# Patient Record
Sex: FEMALE | Race: WHITE | NOT HISPANIC OR LATINO | Employment: OTHER | URBAN - METROPOLITAN AREA
[De-identification: names, ages, dates, MRNs, and addresses within clinical notes are randomized per-mention and may not be internally consistent; named-entity substitution may affect disease eponyms.]

---

## 2023-06-12 PROBLEM — E55.9 HYPOVITAMINOSIS D: Status: ACTIVE | Noted: 2023-06-12

## 2023-06-12 PROBLEM — L03.90 CELLULITIS: Status: ACTIVE | Noted: 2023-06-12
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
## 2023-06-12 PROBLEM — M62.838 MUSCLE SPASMS OF NECK: Status: ACTIVE | Noted: 2023-06-12

## 2023-06-12 PROBLEM — R49.0 MUSCULAR TENSION DYSPHONIA: Status: ACTIVE | Noted: 2023-06-12

## 2023-06-12 PROBLEM — R49.0 HOARSENESS: Status: ACTIVE | Noted: 2023-06-12

## 2023-06-12 PROBLEM — E78.5 DYSLIPIDEMIA: Status: ACTIVE | Noted: 2023-06-12

## 2023-06-12 PROBLEM — J20.9 ACUTE BRONCHITIS: Status: ACTIVE | Noted: 2023-06-12

## 2023-06-12 PROBLEM — J30.0 VASOMOTOR RHINITIS: Status: ACTIVE | Noted: 2023-06-12

## 2023-06-12 PROBLEM — M19.90 ARTHRITIS: Status: ACTIVE | Noted: 2023-06-12

## 2023-06-12 PROBLEM — M54.2 CERVICALGIA: Status: ACTIVE | Noted: 2023-06-12

## 2023-06-12 PROBLEM — R09.81 NASAL CONGESTION: Status: ACTIVE | Noted: 2023-06-12

## 2023-06-12 PROBLEM — G89.29 CHRONIC LEFT SHOULDER PAIN: Status: ACTIVE | Noted: 2023-06-12

## 2023-06-12 PROBLEM — J32.9 CHRONIC SINUSITIS: Status: ACTIVE | Noted: 2023-06-12

## 2023-06-12 PROBLEM — J31.0 CHRONIC RHINITIS: Status: ACTIVE | Noted: 2023-06-12

## 2023-06-12 PROBLEM — M53.3 COCCYDYNIA: Status: ACTIVE | Noted: 2023-06-12

## 2023-06-12 PROBLEM — D75.89 MACROCYTOSIS: Status: ACTIVE | Noted: 2023-06-12

## 2023-06-12 PROBLEM — R49.0 RASPY VOICE: Status: ACTIVE | Noted: 2023-06-12

## 2023-06-12 PROBLEM — M25.519 SHOULDER PAIN: Status: ACTIVE | Noted: 2023-06-12

## 2023-06-12 PROBLEM — N95.1 MENOPAUSAL SYMPTOM: Status: ACTIVE | Noted: 2023-06-12

## 2023-06-12 PROBLEM — M13.0 POLYARTHROPATHY: Status: ACTIVE | Noted: 2023-06-12

## 2023-06-12 PROBLEM — M25.512 CHRONIC LEFT SHOULDER PAIN: Status: ACTIVE | Noted: 2023-06-12

## 2023-06-12 RX ORDER — GABAPENTIN 100 MG/1
100 CAPSULE ORAL NIGHTLY
COMMUNITY
End: 2024-01-05 | Stop reason: SDUPTHER

## 2023-06-12 RX ORDER — FLUTICASONE PROPIONATE 50 MCG
1 SPRAY, SUSPENSION (ML) NASAL DAILY
COMMUNITY
Start: 2017-12-12

## 2023-06-12 RX ORDER — ACETAMINOPHEN 500 MG
1 TABLET ORAL DAILY
COMMUNITY

## 2023-06-12 RX ORDER — IPRATROPIUM BROMIDE 42 UG/1
1 SPRAY, METERED NASAL DAILY
COMMUNITY
Start: 2018-06-05 | End: 2024-01-05 | Stop reason: SDUPTHER

## 2023-06-12 RX ORDER — CELECOXIB 200 MG/1
200 CAPSULE ORAL 2 TIMES DAILY
COMMUNITY
Start: 2021-06-07 | End: 2023-06-13

## 2023-06-12 RX ORDER — PANTOPRAZOLE SODIUM 20 MG/1
1 TABLET, DELAYED RELEASE ORAL DAILY
COMMUNITY
Start: 2021-06-07 | End: 2023-06-13

## 2023-06-13 ENCOUNTER — OFFICE VISIT (OUTPATIENT)
Dept: PRIMARY CARE | Facility: CLINIC | Age: 78
End: 2023-06-13
Payer: MEDICARE

## 2023-06-13 VITALS
DIASTOLIC BLOOD PRESSURE: 66 MMHG | WEIGHT: 130.6 LBS | TEMPERATURE: 97.2 F | SYSTOLIC BLOOD PRESSURE: 120 MMHG | OXYGEN SATURATION: 99 % | BODY MASS INDEX: 22.42 KG/M2 | HEART RATE: 76 BPM | RESPIRATION RATE: 18 BRPM

## 2023-06-13 DIAGNOSIS — Z00.00 MEDICARE ANNUAL WELLNESS VISIT, SUBSEQUENT: Primary | ICD-10-CM

## 2023-06-13 PROCEDURE — 1036F TOBACCO NON-USER: CPT | Performed by: INTERNAL MEDICINE

## 2023-06-13 PROCEDURE — 1170F FXNL STATUS ASSESSED: CPT | Performed by: INTERNAL MEDICINE

## 2023-06-13 PROCEDURE — G0439 PPPS, SUBSEQ VISIT: HCPCS | Performed by: INTERNAL MEDICINE

## 2023-06-13 PROCEDURE — 1159F MED LIST DOCD IN RCRD: CPT | Performed by: INTERNAL MEDICINE

## 2023-06-13 RX ORDER — ESTRADIOL 0.03 MG/D
1 PATCH TRANSDERMAL
COMMUNITY
Start: 2022-12-05 | End: 2024-03-12 | Stop reason: WASHOUT

## 2023-06-13 RX ORDER — MEDROXYPROGESTERONE ACETATE 10 MG/1
2 TABLET ORAL
COMMUNITY
Start: 2023-02-22 | End: 2024-03-12 | Stop reason: WASHOUT

## 2023-06-13 RX ORDER — IPRATROPIUM BROMIDE 42 UG/1
1 SPRAY, METERED NASAL DAILY PRN
COMMUNITY
Start: 2022-12-19 | End: 2023-06-13

## 2023-06-13 ASSESSMENT — PATIENT HEALTH QUESTIONNAIRE - PHQ9
2. FEELING DOWN, DEPRESSED OR HOPELESS: NOT AT ALL
1. LITTLE INTEREST OR PLEASURE IN DOING THINGS: NOT AT ALL
SUM OF ALL RESPONSES TO PHQ9 QUESTIONS 1 AND 2: 0

## 2023-06-13 ASSESSMENT — ACTIVITIES OF DAILY LIVING (ADL)
DOING_HOUSEWORK: INDEPENDENT
BATHING: INDEPENDENT
GROCERY_SHOPPING: INDEPENDENT
DRESSING: INDEPENDENT
MANAGING_FINANCES: INDEPENDENT
TAKING_MEDICATION: INDEPENDENT

## 2023-06-13 ASSESSMENT — PAIN SCALES - GENERAL: PAINLEVEL: 0-NO PAIN

## 2023-06-13 NOTE — PROGRESS NOTES
Subjective   Reason for Visit: Maria Luisa Tirado is an 77 y.o. female here for a Medicare Wellness visit.     Past Medical, Surgical, and Family History reviewed and updated in chart.    Reviewed all medications by prescribing practitioner or clinical pharmacist (such as prescriptions, OTCs, herbal therapies and supplements) and documented in the medical record.    HPIPatient presents to the office for scheduled visit and Medicare wellness.    Last seen in the office back in December    At that time a bone density was ordered and labs requested we reviewed that up-to-date including bone density being normal    Overall has been stable blood pressure is great compliant active and falls no serious other changes in her health    Patient Care Team:  Michaela Hassan MD as PCP - General (Internal Medicine)  Michaela Hassan MD as PCP - [employer]vantage PCP     Review of [id number] systems reviewed does not mention were negative    Objective   Vitals:  /66 (BP Location: Left arm, Patient Position: Sitting, BP Cuff Size: Adult)   Pulse 76   Temp 36.2 °C (97.2 °F)   Resp 18   Wt 59.2 kg (130 lb 9.6 oz)   SpO2 99%   BMI 22.42 kg/m²       Physical ExamHEENT normocephalic atraumatic pupils round and reactive no oropharyngeal exudate no thyromegaly  Carotid bruits absent  Cardiovascular regular rate and rhythm no murmurs  Respiratory bilateral breath sounds without rales or rhonchi or mitral chest expansion bilaterally  Abdomen soft nontender bowel sounds are present no organomegaly  Skin warm without any rashes  Musculoskeletal no digital clubbing or cyanosis normal gait no muscle atrophy  Neuro alert and oriented Ãƒ-3. Speech clear. Follows commands appropriately  Pulse normal carotid pulse normal radial pulse normal pedal pulses      Assessment/Plan   Problem List Items Addressed This Visit          Other    Medicare annual wellness visit, subsequent - Primary   Bone density discussed    No follow-up     Labs  reviewed    Meds reconciled    Vaccination up-to-date

## 2023-12-05 ENCOUNTER — APPOINTMENT (OUTPATIENT)
Dept: PRIMARY CARE | Facility: CLINIC | Age: 78
End: 2023-12-05
Payer: MEDICARE

## 2024-01-05 ENCOUNTER — OFFICE VISIT (OUTPATIENT)
Dept: PRIMARY CARE | Facility: CLINIC | Age: 79
End: 2024-01-05
Payer: MEDICARE

## 2024-01-05 VITALS
HEIGHT: 64 IN | SYSTOLIC BLOOD PRESSURE: 112 MMHG | OXYGEN SATURATION: 98 % | WEIGHT: 131 LBS | DIASTOLIC BLOOD PRESSURE: 70 MMHG | BODY MASS INDEX: 22.36 KG/M2 | HEART RATE: 88 BPM

## 2024-01-05 DIAGNOSIS — Z00.00 MEDICARE ANNUAL WELLNESS VISIT, SUBSEQUENT: Primary | ICD-10-CM

## 2024-01-05 DIAGNOSIS — N95.1 MENOPAUSAL SYMPTOM: ICD-10-CM

## 2024-01-05 DIAGNOSIS — G89.29 CHRONIC SHOULDER PAIN, UNSPECIFIED LATERALITY: ICD-10-CM

## 2024-01-05 DIAGNOSIS — M54.2 CERVICALGIA: ICD-10-CM

## 2024-01-05 DIAGNOSIS — J30.0 VASOMOTOR RHINITIS: ICD-10-CM

## 2024-01-05 DIAGNOSIS — M13.0 POLYARTHROPATHY: ICD-10-CM

## 2024-01-05 DIAGNOSIS — M25.519 CHRONIC SHOULDER PAIN, UNSPECIFIED LATERALITY: ICD-10-CM

## 2024-01-05 DIAGNOSIS — L84 CORN OF FOOT: ICD-10-CM

## 2024-01-05 PROCEDURE — 1126F AMNT PAIN NOTED NONE PRSNT: CPT | Performed by: STUDENT IN AN ORGANIZED HEALTH CARE EDUCATION/TRAINING PROGRAM

## 2024-01-05 PROCEDURE — 1160F RVW MEDS BY RX/DR IN RCRD: CPT | Performed by: STUDENT IN AN ORGANIZED HEALTH CARE EDUCATION/TRAINING PROGRAM

## 2024-01-05 PROCEDURE — 1170F FXNL STATUS ASSESSED: CPT | Performed by: STUDENT IN AN ORGANIZED HEALTH CARE EDUCATION/TRAINING PROGRAM

## 2024-01-05 PROCEDURE — 1124F ACP DISCUSS-NO DSCNMKR DOCD: CPT | Performed by: STUDENT IN AN ORGANIZED HEALTH CARE EDUCATION/TRAINING PROGRAM

## 2024-01-05 PROCEDURE — 1159F MED LIST DOCD IN RCRD: CPT | Performed by: STUDENT IN AN ORGANIZED HEALTH CARE EDUCATION/TRAINING PROGRAM

## 2024-01-05 PROCEDURE — 1036F TOBACCO NON-USER: CPT | Performed by: STUDENT IN AN ORGANIZED HEALTH CARE EDUCATION/TRAINING PROGRAM

## 2024-01-05 PROCEDURE — 99213 OFFICE O/P EST LOW 20 MIN: CPT | Performed by: STUDENT IN AN ORGANIZED HEALTH CARE EDUCATION/TRAINING PROGRAM

## 2024-01-05 PROCEDURE — G0439 PPPS, SUBSEQ VISIT: HCPCS | Performed by: STUDENT IN AN ORGANIZED HEALTH CARE EDUCATION/TRAINING PROGRAM

## 2024-01-05 RX ORDER — GABAPENTIN 100 MG/1
100 CAPSULE ORAL NIGHTLY
Qty: 90 CAPSULE | Refills: 0 | Status: SHIPPED | OUTPATIENT
Start: 2024-01-05

## 2024-01-05 RX ORDER — IPRATROPIUM BROMIDE 42 UG/1
2 SPRAY, METERED NASAL DAILY
Qty: 15 ML | Refills: 2 | Status: SHIPPED | OUTPATIENT
Start: 2024-01-05 | End: 2024-04-01 | Stop reason: SDUPTHER

## 2024-01-05 ASSESSMENT — ACTIVITIES OF DAILY LIVING (ADL)
GROCERY_SHOPPING: INDEPENDENT
MANAGING_FINANCES: INDEPENDENT
DOING_HOUSEWORK: INDEPENDENT
BATHING: INDEPENDENT
DRESSING: INDEPENDENT
TAKING_MEDICATION: INDEPENDENT

## 2024-01-05 ASSESSMENT — PATIENT HEALTH QUESTIONNAIRE - PHQ9
2. FEELING DOWN, DEPRESSED OR HOPELESS: NOT AT ALL
SUM OF ALL RESPONSES TO PHQ9 QUESTIONS 1 AND 2: 0
1. LITTLE INTEREST OR PLEASURE IN DOING THINGS: NOT AT ALL

## 2024-01-05 NOTE — PROGRESS NOTES
Subjective   Patient ID: Maria Luisa Tirado is a 78 y.o. female who presents for Medicare Annual Wellness Visit Subsequent (New patient medicare annual wellness/Corns on the bottoms of her feet that are painful , and makes it hard to walk/Recently had basal cell skin cancer removed on 2 spots on her left leg).  Long history of corns on her feet. Interested in seeing a podiatrist.     Chronic arthritis. Right shoulder pain.     Hand numbness and tingling. Sitting up makes it worse. Affecting her sleep.     Carpal tunnel surgery both wrists 2002.     Takes gabapentin for low back pain. Usually just 100mg. Has not tried higher doses at bedtime.     Still gets bad hot flashes. Uses estrogen patch.     Walks 2-3 miles per day. No problems with that.     Digestion is good.     Retired  nurse.         Review of Systems   Constitutional: Negative.    HENT:  Positive for rhinorrhea.    Respiratory: Negative.     Cardiovascular: Negative.    Gastrointestinal: Negative.    Endocrine: Positive for heat intolerance.   Genitourinary: Negative.    Musculoskeletal:  Negative for neck pain.   Neurological: Negative.    Psychiatric/Behavioral:  Positive for sleep disturbance.    All other systems reviewed and are negative.      Objective   Physical Exam  Vitals reviewed.   Constitutional:       Appearance: Normal appearance.   HENT:      Head: Normocephalic.   Eyes:      Pupils: Pupils are equal, round, and reactive to light.   Cardiovascular:      Rate and Rhythm: Normal rate and regular rhythm.   Pulmonary:      Effort: Pulmonary effort is normal. No respiratory distress.      Breath sounds: Normal breath sounds. No wheezing or rhonchi.   Musculoskeletal:         General: Normal range of motion.   Neurological:      General: No focal deficit present.      Mental Status: She is alert.   Psychiatric:         Behavior: Behavior normal.         Body mass index is 22.49 kg/m².      Current Outpatient Medications:     cholecalciferol  (Vitamin D-3) 5,000 Units tablet, Take 1 tablet (5,000 Units) by mouth once daily., Disp: , Rfl:     estradiol (Climara) 0.025 mg/24 hr patch, Place 1 patch on the skin 1 (one) time per week., Disp: , Rfl:     fluticasone (Flonase) 50 mcg/actuation nasal spray, Administer 1 spray into affected nostril(s) once daily., Disp: , Rfl:     medroxyPROGESTERone (Provera) 10 mg tablet, 2 mg.  TAKE ONE TABLET BY MOUTH ONCE A WEEK WITH PATCH AS DIRECTED, Disp: , Rfl:     gabapentin (Neurontin) 100 mg capsule, Take 1 capsule (100 mg) by mouth once daily at bedtime., Disp: 90 capsule, Rfl: 0    ipratropium (Atrovent) 42 mcg (0.06 %) nasal spray, Administer 2 sprays into each nostril once daily., Disp: 15 mL, Rfl: 2      Assessment/Plan   Diagnoses and all orders for this visit:  Medicare annual wellness visit, subsequent  Comments:  UTD on mammogram and colonoscopy  [place] of foot  -     Referral to Podiatry; Future  Cervicalgia  Comments:  xray ordered  recommended increasing gabapentin at bedtime to see if offers more relief  Orders:  -     gabapentin (Neurontin) 100 mg capsule; Take 1 capsule (100 mg) by mouth once daily at bedtime.  -     XR cervical spine 2-3 views; Future  Vasomotor rhinitis  -     ipratropium (Atrovent) 42 mcg (0.06 %) nasal spray; Administer 2 sprays into each nostril once daily.  Chronic shoulder pain, unspecified laterality  Polyarthropathy  Menopausal symptom    Follow up in 6 months       Ysabel Ignacio DO 01/07/24 3:06 PM

## 2024-01-07 ASSESSMENT — ENCOUNTER SYMPTOMS
CARDIOVASCULAR NEGATIVE: 1
RHINORRHEA: 1
RESPIRATORY NEGATIVE: 1
GASTROINTESTINAL NEGATIVE: 1
NECK PAIN: 0
SLEEP DISTURBANCE: 1
CONSTITUTIONAL NEGATIVE: 1
NEUROLOGICAL NEGATIVE: 1

## 2024-01-08 ENCOUNTER — ANCILLARY PROCEDURE (OUTPATIENT)
Dept: RADIOLOGY | Facility: CLINIC | Age: 79
End: 2024-01-08
Payer: MEDICARE

## 2024-01-08 DIAGNOSIS — M54.2 CERVICALGIA: ICD-10-CM

## 2024-01-08 PROCEDURE — 72040 X-RAY EXAM NECK SPINE 2-3 VW: CPT

## 2024-01-08 PROCEDURE — 72040 X-RAY EXAM NECK SPINE 2-3 VW: CPT | Performed by: RADIOLOGY

## 2024-01-13 ENCOUNTER — PATIENT MESSAGE (OUTPATIENT)
Dept: PRIMARY CARE | Facility: CLINIC | Age: 79
End: 2024-01-13
Payer: MEDICARE

## 2024-01-13 DIAGNOSIS — M54.2 CERVICALGIA: Primary | ICD-10-CM

## 2024-01-13 DIAGNOSIS — M25.519 CHRONIC SHOULDER PAIN, UNSPECIFIED LATERALITY: ICD-10-CM

## 2024-01-13 DIAGNOSIS — G89.29 CHRONIC SHOULDER PAIN, UNSPECIFIED LATERALITY: ICD-10-CM

## 2024-01-31 ENCOUNTER — EVALUATION (OUTPATIENT)
Dept: PHYSICAL THERAPY | Facility: CLINIC | Age: 79
End: 2024-01-31
Payer: MEDICARE

## 2024-01-31 DIAGNOSIS — M54.2 CERVICALGIA: ICD-10-CM

## 2024-01-31 DIAGNOSIS — G89.29 CHRONIC SHOULDER PAIN, UNSPECIFIED LATERALITY: ICD-10-CM

## 2024-01-31 DIAGNOSIS — M25.519 CHRONIC SHOULDER PAIN, UNSPECIFIED LATERALITY: ICD-10-CM

## 2024-01-31 PROCEDURE — 97110 THERAPEUTIC EXERCISES: CPT | Mod: GP | Performed by: PHYSICAL THERAPIST

## 2024-01-31 PROCEDURE — 97161 PT EVAL LOW COMPLEX 20 MIN: CPT | Mod: GP | Performed by: PHYSICAL THERAPIST

## 2024-01-31 ASSESSMENT — COLUMBIA-SUICIDE SEVERITY RATING SCALE - C-SSRS
2. HAVE YOU ACTUALLY HAD ANY THOUGHTS OF KILLING YOURSELF?: NO
1. IN THE PAST MONTH, HAVE YOU WISHED YOU WERE DEAD OR WISHED YOU COULD GO TO SLEEP AND NOT WAKE UP?: NO
6. HAVE YOU EVER DONE ANYTHING, STARTED TO DO ANYTHING, OR PREPARED TO DO ANYTHING TO END YOUR LIFE?: NO

## 2024-01-31 ASSESSMENT — ENCOUNTER SYMPTOMS
DEPRESSION: 0
OCCASIONAL FEELINGS OF UNSTEADINESS: 0
LOSS OF SENSATION IN FEET: 0

## 2024-01-31 ASSESSMENT — PATIENT HEALTH QUESTIONNAIRE - PHQ9
SUM OF ALL RESPONSES TO PHQ9 QUESTIONS 1 AND 2: 0
2. FEELING DOWN, DEPRESSED OR HOPELESS: NOT AT ALL
1. LITTLE INTEREST OR PLEASURE IN DOING THINGS: NOT AT ALL

## 2024-01-31 NOTE — PROGRESS NOTES
Physical Therapy    Physical Therapy Evaluation    Patient Name: Maria Luisa Tirado  MRN: [id number]  Today's Date: 01/31/24  Time Calculation  Start Time: 1115  Stop Time: 1200  Time Calculation (min): 45 min     Insurance:  Visit number: 1   Insurance Type: Payor: [employer] / Plan: [employer] ADVANTAGE / Product Type: *No Product type* /   Authorization or Plan of Care date Range: needs authorization    Therapy diagnoses:   1. Cervicalgia  Referral to Physical Therapy    Follow Up In Physical Therapy      2. Chronic shoulder pain, unspecified laterality  Referral to Physical Therapy    Follow Up In Physical Therapy           General:  Reason for visit: cervicalgia    Referred by:  DO Fabian Solitario MD appt:  7/17/24     Precautions:  none  Fall Risk: Low     Assessment:   Patient presents with signs and symptoms of cervical spine arthritis with radicular symptoms. Will benefit from skilled PT in order to decrease pain with mobility and ADL's    Impairments: Pain, Strength, Activity limitations, Flexibility, Joint mobility, Posture, Decreased functional level, and Participation restrictions    Functional Limitations: Reaching, Lifting, Dressing, Sleeping, and Sitting    Recommended Treatment:  Therapeutic exercise, Manual therapy, Home program instruction and progression, Neuromuscular re-education, Therapeutic activities, Self care and home management, Instruction in activity modification, and Electrical stimulation      Plan:  Plan of care was developed with input and agreement by the patient.  1-2x per week for 4-6 weeks    Rehab Potential: Good to achieve goals.    Goals:  Short Term Goals:   -Patient to be Indep with Home Exercise Program for symptom management.    Long Term Goals:   -NDI: 0-19% impairment to indicate a significant improvement in overall function.      -Patient will demonstrate correct posture with min to no cueing to allow for correct loading strategy.    -Patient will demo mild to  "no limitation AROM of the cervical spine to allow for correct mechanics with functional mobility.      -Patient will report driving without pain to allow for return to work and ADLs without limitation.     -Patient will report reading >30 min without pain to allow for return to work and ADLs without limitation.       Subjective:  - CC:  cervical radiculopathy, can radiate down both arms. Can have numbness and tingling at times. Some neck pain when in poor posture  - KURT:  chronic   - DOI/onset: last summer  - PAIN - Location: bilateral UE Worst(past 24 hours): 6-7/10   - PAIN - Alleviating: ibuprofen, ice  Aggravating: dressing, sleeping, housework, reading  - CURRENT MEDICAL MANAGEMENT: NSAIDs  - PLOF: some hx of carpal tunnel  - FUNCTIONAL LIMITATIONS: reaching, lifting, sitting, housework   - LIVING ENVIRONMENT: no barriers  - WORK: retired nurse  - EXERCISE:minimal at this time     Medical History Form: Reviewed (scanned into chart)       Objective:   Patient denies symptoms consistent with diplopia, dizziness, drop attacks, dysarthria, dysphagia, ataxia, nausea, numbness, and/or nystagmus.     -POSTURE:   Sitting: Fair  Standing Fair  Protruded head: yes    -PALPATION: tender bilateral UT/levator and cervical paraspinals    -AROM  Flexion: WFL  Extension:  WFL  Sidebending(R):  30 deg  Sidebending (L):  25 deg  Rotation (R): 55 deg  Rotation (L): 45 deg    - DERMATIOMES B UE: WFL    - MYOTOMES (MMT in sitting): WFL    - REPEATED MOTIONS:  produces pain with extension, no pain with cervical retraction    - SHOULDER AROM and MMT is WFL                     - CERVICAL SPECIAL TESTS :  Spurling Compression: increases pain  Distraction decreases pain    Outcome Measures:  Other Measures  Neck Disability Index: 10     Treatment Performed: (\"NP\" = Not Performed)     Therapeutic Exercise:     15 minutes  Access Code: [email or web address]  URL: [email or web address]  Date: 01/31/2024  Prepared by: Ulysses" Carlota    Exercises  - Seated Cervical Retraction  - 1 x daily - 7 x weekly - 2 sets - 10 reps - 5 hold  - Supine Cervical Retraction with Towel  - 1 x daily - 7 x weekly - 2 sets - 10 reps - 5 hold  - Seated Upper Trapezius Stretch  - 1 x daily - 7 x weekly - 3 reps - 30 hold  - Gentle Levator Scapulae Stretch  - 1 x daily - 7 x weekly - 3 reps - 30 hold  - Seated Cervical Sidebending AROM  - 1 x daily - 7 x weekly - 20 reps - 5 hold  - Seated Cervical Rotation AROM  - 1 x daily - 7 x weekly - 20 reps - 5 hold  - Seated Scapular Retraction  - 1 x daily - 7 x weekly - 20 reps - 5 hold  - Seated Shoulder Shrug Circles AROM Backward  - 1 x daily - 7 x weekly - 20 reps - 5 hold    Manual Therapy:       minutes      Neuromuscular Re-education:      minutes      Gait Training:            minutes      Therapeutic Activity:      minutes      Modalities:       Vasopneumatic Device       minutes  Electrical Stimulation          minutes  Ultrasound            minutes  Iontophoresis                     minutes  Cold Pack            minutes  Mechanical Traction           minutes    Evaluation Complexity: Low: 30 minutes; Moderate   minutes; Complex PT Evaluation Time Entry: 30;  minutes    Re-Evaluation:   minutes

## 2024-02-07 ENCOUNTER — TREATMENT (OUTPATIENT)
Dept: PHYSICAL THERAPY | Facility: CLINIC | Age: 79
End: 2024-02-07
Payer: MEDICARE

## 2024-02-07 DIAGNOSIS — M54.2 CERVICALGIA: ICD-10-CM

## 2024-02-07 DIAGNOSIS — M25.519 CHRONIC SHOULDER PAIN, UNSPECIFIED LATERALITY: ICD-10-CM

## 2024-02-07 DIAGNOSIS — G89.29 CHRONIC SHOULDER PAIN, UNSPECIFIED LATERALITY: ICD-10-CM

## 2024-02-07 PROCEDURE — 97140 MANUAL THERAPY 1/> REGIONS: CPT | Mod: GP | Performed by: PHYSICAL THERAPIST

## 2024-02-07 PROCEDURE — 97110 THERAPEUTIC EXERCISES: CPT | Mod: GP | Performed by: PHYSICAL THERAPIST

## 2024-02-14 ENCOUNTER — TREATMENT (OUTPATIENT)
Dept: PHYSICAL THERAPY | Facility: CLINIC | Age: 79
End: 2024-02-14
Payer: MEDICARE

## 2024-02-14 DIAGNOSIS — M54.2 CERVICALGIA: ICD-10-CM

## 2024-02-14 DIAGNOSIS — G89.29 CHRONIC SHOULDER PAIN, UNSPECIFIED LATERALITY: ICD-10-CM

## 2024-02-14 DIAGNOSIS — M25.519 CHRONIC SHOULDER PAIN, UNSPECIFIED LATERALITY: ICD-10-CM

## 2024-02-14 PROCEDURE — 97140 MANUAL THERAPY 1/> REGIONS: CPT | Mod: GP | Performed by: PHYSICAL THERAPIST

## 2024-02-14 PROCEDURE — 97110 THERAPEUTIC EXERCISES: CPT | Mod: GP | Performed by: PHYSICAL THERAPIST

## 2024-02-14 NOTE — PROGRESS NOTES
"                                                                                                                         PHYSICAL THERAPY TREATMENT NOTE    Patient Name:  Maria Luisa Tirado   Patient MRN: [id number]  Date: 02/14/24  Time Calculation  Start Time: 1110  Stop Time: 1150  Time Calculation (min): 40 min    Insurance:  Visit number: 3 of 5  Insurance Type: Payor: [employer] / Plan: [employer] MEDICARE ADVANTAGE / Product Type: *No Product type* /   Authorization or Plan of Care date Range: exp 3/30/24    Therapy diagnoses:   1. Cervicalgia  Follow Up In Physical Therapy      2. Chronic shoulder pain, unspecified laterality  Follow Up In Physical Therapy        General:  Reason for visit: cervicalgia    Referred by: DO Fabian Solitario MD appt:  7/17/24     Precautions:  none  Fall Risk: Low    Assessment:  Education: Reviewed home exercise program. Provided verbal feedback to improve exercise technique.  Progress towards functional goals: Reduced intensity of pain.  Response to interventions: Patient tolerated therapeutic interventions well and without any adverse events. Patient was appropriately fatigued with no complaints.  Justification for continued skilled care: To address remaining functional, objective and subjective deficits to allow them to return to full independence with ADLs. Reduce pain to improve function.    Plan:  Exercises targeting surrounding musculature to stabilize the joint and improve function. Manual therapy to improve joint mobility.    Subjective:   Maria Luisa reports she feels like her condition is neither improving nor worsening. Continues to be L sided mostly, trying to be more aware of posture when walking   Pain (0-10): 5    HEP adherence / understanding: compliance with the instructed home exercises.    Objective:  More tender with palpation L sided cervical parapsinals and UT    Treatment Performed: (\"NP\" = Not Performed)     Therapeutic Exercise:     25 minutes  UBE 6' " retro  Pulleys x 20 ea flex/scaption  UT stretch 30s x 3  Standing TB row/ext G 2x10 ea  Standing TB bilateral ABD/ER R x 20 ea  Cervical retraction 5s x 20 supine    Manual Therapy:     15 minutes  Performed manual cervical traction in supine and STM to cervical paraspinals more L sided    Neuromuscular Re-education:      minutes      Therapeutic Activity:      minutes      Gait Training:            minutes      Modalities:       Vasopneumatic Device       minutes  Electrical Stimulation          minutes  Ultrasound            minutes  Iontophoresis                     minutes  Cold Pack            minutes  Mechanical Traction           minutes    Evaluation Complexity: Low:   minutes; Moderate   minutes; Complex   minutes    Re-Evaluation:   minutes

## 2024-02-21 ENCOUNTER — TREATMENT (OUTPATIENT)
Dept: PHYSICAL THERAPY | Facility: CLINIC | Age: 79
End: 2024-02-21
Payer: MEDICARE

## 2024-02-21 DIAGNOSIS — M54.2 CERVICALGIA: ICD-10-CM

## 2024-02-21 DIAGNOSIS — M25.519 CHRONIC SHOULDER PAIN, UNSPECIFIED LATERALITY: ICD-10-CM

## 2024-02-21 DIAGNOSIS — G89.29 CHRONIC SHOULDER PAIN, UNSPECIFIED LATERALITY: ICD-10-CM

## 2024-02-21 PROCEDURE — 97140 MANUAL THERAPY 1/> REGIONS: CPT | Mod: GP | Performed by: PHYSICAL THERAPIST

## 2024-02-21 PROCEDURE — 97110 THERAPEUTIC EXERCISES: CPT | Mod: GP | Performed by: PHYSICAL THERAPIST

## 2024-02-21 NOTE — PROGRESS NOTES
"                                                                                                                         PHYSICAL THERAPY TREATMENT NOTE    Patient Name:  Maria Luisa Tirado   Patient MRN: [id number]  Date: 02/21/24  Time Calculation  Start Time: 1030  Stop Time: 1110  Time Calculation (min): 40 min    Insurance:  Visit number: 4 of 5  Insurance Type: Payor: [employer] / Plan: [employer] MEDICARE ADVANTAGE / Product Type: *No Product type* /   Authorization or Plan of Care date Range: exp 3/30/24    Therapy diagnoses:   1. Cervicalgia  Follow Up In Physical Therapy      2. Chronic shoulder pain, unspecified laterality  Follow Up In Physical Therapy        General:  Reason for visit: cervicalgia    Referred by: DO Fabian Solitario MD appt:  7/17/24     Precautions:  none  Fall Risk: Low    Assessment:  Education: Provided manual cues for correct performance of exercises.  Progress towards functional goals: Improved reaching ability. Reduced frequency of pain. Reduced intensity of pain.  Response to interventions: Tolerated treatment session well without any increase in pain. Improved tolerance to strengthening.  Justification for continued skilled care: Skilled intervention required to improve ROM which will improve function. Progressive strengthening to stabilize the shoujlders to improve function.    Plan:  Next appointment will be a reassessment.    Subjective:   Maria Luisa reports she feels like her condition is improving.  Had a reaction to RSV vaccine and had L arm pain and difficulty lifting over the weekend. Is better now   Pain (0-10): 2    HEP adherence / understanding: compliance with the instructed home exercises.    Objective:  More tender with palpation L sided cervical parapsinals and UT    Treatment Performed: (\"NP\" = Not Performed)     HEP: Access Code: [email or web address]     Therapeutic Exercise:     25 minutes  UBE 6' retro  Pulleys x 20 ea flex/scaption  UT stretch 30s x 3  Standing TB row/ext G 2x10 " ea  Standing TB bilateral ABD/ER R x 20 ea  Cervical retraction 5s x 20 supine    Manual Therapy:     15 minutes  Performed manual cervical traction in supine and STM to cervical paraspinals more L sided    Neuromuscular Re-education:      minutes      Therapeutic Activity:      minutes      Gait Training:            minutes      Modalities:       Vasopneumatic Device       minutes  Electrical Stimulation          minutes  Ultrasound            minutes  Iontophoresis                     minutes  Cold Pack            minutes  Mechanical Traction           minutes    Evaluation Complexity: Low:   minutes; Moderate   minutes; Complex   minutes    Re-Evaluation:   minutes

## 2024-02-28 ENCOUNTER — TREATMENT (OUTPATIENT)
Dept: PHYSICAL THERAPY | Facility: CLINIC | Age: 79
End: 2024-02-28
Payer: MEDICARE

## 2024-02-28 DIAGNOSIS — M54.2 CERVICALGIA: ICD-10-CM

## 2024-02-28 DIAGNOSIS — G89.29 CHRONIC SHOULDER PAIN, UNSPECIFIED LATERALITY: ICD-10-CM

## 2024-02-28 DIAGNOSIS — M25.519 CHRONIC SHOULDER PAIN, UNSPECIFIED LATERALITY: ICD-10-CM

## 2024-02-28 PROCEDURE — 97110 THERAPEUTIC EXERCISES: CPT | Mod: GP | Performed by: PHYSICAL THERAPIST

## 2024-02-28 PROCEDURE — 97140 MANUAL THERAPY 1/> REGIONS: CPT | Mod: GP | Performed by: PHYSICAL THERAPIST

## 2024-02-28 NOTE — PROGRESS NOTES
PHYSICAL THERAPY TREATMENT NOTE    Patient Name:  Maria Luisa Tirado   Patient MRN: [id number]  Date: 02/28/24  Time Calculation  Start Time: 1030  Stop Time: 1115  Time Calculation (min): 45 min    Insurance:  Visit number: 5 of 5  Insurance Type: Payor: [employer] / Plan: [employer] MEDICARE ADVANTAGE / Product Type: *No Product type* /   Authorization or Plan of Care date Range: exp 3/30/24    Therapy diagnoses:   1. Cervicalgia  Follow Up In Physical Therapy      2. Chronic shoulder pain, unspecified laterality  Follow Up In Physical Therapy        General:  Reason for visit: cervicalgia    Referred by: DO Fabian Solitario MD appt:  7/17/24     Precautions:  none  Fall Risk: Low    Assessment:  Education: Reviewed home exercise program.  Progress towards functional goals: Improved ability to sleep thru the night. Improved lifting ability. Improved reaching ability. Reduced intensity of pain.  Response to interventions: Patient tolerated therapeutic interventions well and without any adverse events. Improved independence with home exercises.    Plan:  Discharge from physical therapy.    Subjective:   Maria Luisa reports she feels like her condition is improving.  Still some tingling in the hand that is the issue   Pain (0-10): 2    HEP adherence / understanding: compliance with the instructed home exercises.      Objective:  Short Term Goals:   -Patient to be Indep with Home Exercise Program for symptom management.-MET     Long Term Goals:   -NDI: 0-19% impairment to indicate a significant improvement in overall function.  -not met, 22%     -Patient will demonstrate correct posture with min to no cueing to allow for correct loading strategy.-MET     -Patient will demo mild to no limitation AROM of the cervical spine to allow for correct mechanics with functional mobility. -ME, WFL     -Patient will  "report driving without pain to allow for return to work and ADLs without limitation. -Not net, pain when looking     -Patient will report reading >30 min without pain to allow for return to work and ADLs without limitation. -MET    Treatment Performed: (\"NP\" = Not Performed)     HEP: Access Code: [email or web address]     Therapeutic Exercise:     25 minutes  UBE 6' retro  Pulleys x 20 ea flex/scaption  UT stretch 30s x 3  Standing TB row/ext G 2x10 ea  Standing TB bilateral ABD/ER R x 20 ea  Cervical retraction 5s x 20 supine    Manual Therapy:     20 minutes  Performed manual cervical traction in supine and STM to cervical paraspinals more L sided    Neuromuscular Re-education:      minutes      Therapeutic Activity:      minutes      Gait Training:            minutes      Modalities:       Vasopneumatic Device       minutes  Electrical Stimulation          minutes  Ultrasound            minutes  Iontophoresis                     minutes  Cold Pack            minutes  Mechanical Traction           minutes    Evaluation Complexity: Low:   minutes; Moderate   minutes; Complex   minutes    Re-Evaluation:   minutes           "

## 2024-03-06 ENCOUNTER — APPOINTMENT (OUTPATIENT)
Dept: PHYSICAL THERAPY | Facility: CLINIC | Age: 79
End: 2024-03-06
Payer: MEDICARE

## 2024-03-12 PROBLEM — M25.549 ARTHRALGIA OF HAND: Status: ACTIVE | Noted: 2024-03-12

## 2024-03-12 PROBLEM — R23.2 FLUSHING: Status: ACTIVE | Noted: 2024-03-12

## 2024-03-12 PROBLEM — M75.00 ADHESIVE CAPSULITIS OF SHOULDER: Status: ACTIVE | Noted: 2024-03-12

## 2024-03-12 PROBLEM — R20.2 PARESTHESIA OF FOOT: Status: ACTIVE | Noted: 2024-03-12

## 2024-03-12 PROBLEM — L84 CORN: Status: ACTIVE | Noted: 2024-03-12

## 2024-03-12 PROBLEM — F41.9 ANXIETY: Status: ACTIVE | Noted: 2024-03-12

## 2024-03-12 RX ORDER — PANTOPRAZOLE SODIUM 20 MG/1
TABLET, DELAYED RELEASE ORAL
COMMUNITY
Start: 2021-06-07 | End: 2024-04-01 | Stop reason: ALTCHOICE

## 2024-03-12 RX ORDER — CLOBETASOL PROPIONATE 0.05 G/100ML
SHAMPOO TOPICAL
COMMUNITY
Start: 2019-09-13 | End: 2024-04-01 | Stop reason: ALTCHOICE

## 2024-03-12 RX ORDER — MINOXIDIL 2.5 MG/1
TABLET ORAL
COMMUNITY
End: 2024-04-01 | Stop reason: ALTCHOICE

## 2024-03-12 RX ORDER — CELECOXIB 200 MG/1
CAPSULE ORAL EVERY 12 HOURS
COMMUNITY
Start: 2021-06-07 | End: 2024-04-01 | Stop reason: ALTCHOICE

## 2024-03-12 RX ORDER — DESOXIMETASONE 2.5 MG/G
CREAM TOPICAL
COMMUNITY
End: 2024-04-01 | Stop reason: ALTCHOICE

## 2024-03-12 RX ORDER — FLUOCINOLONE ACETONIDE 0.1 MG/G
CREAM TOPICAL
COMMUNITY
Start: 2018-09-10

## 2024-03-13 ENCOUNTER — OFFICE VISIT (OUTPATIENT)
Dept: PODIATRY | Facility: CLINIC | Age: 79
End: 2024-03-13
Payer: MEDICARE

## 2024-03-13 DIAGNOSIS — L84 CORN OF FOOT: ICD-10-CM

## 2024-03-13 DIAGNOSIS — M79.671 PAIN IN BOTH FEET: Primary | ICD-10-CM

## 2024-03-13 DIAGNOSIS — M79.672 PAIN IN BOTH FEET: Primary | ICD-10-CM

## 2024-03-13 PROCEDURE — 1036F TOBACCO NON-USER: CPT | Performed by: PODIATRIST

## 2024-03-13 PROCEDURE — 1157F ADVNC CARE PLAN IN RCRD: CPT | Performed by: PODIATRIST

## 2024-03-13 PROCEDURE — 1159F MED LIST DOCD IN RCRD: CPT | Performed by: PODIATRIST

## 2024-03-13 PROCEDURE — 99203 OFFICE O/P NEW LOW 30 MIN: CPT | Performed by: PODIATRIST

## 2024-03-13 PROCEDURE — 1160F RVW MEDS BY RX/DR IN RCRD: CPT | Performed by: PODIATRIST

## 2024-03-13 RX ORDER — ESTRADIOL 0.03 MG/D
1 PATCH TRANSDERMAL
COMMUNITY
End: 2024-04-01 | Stop reason: ALTCHOICE

## 2024-03-13 NOTE — PROGRESS NOTES
"Chief Complaint   Patient presents with    Foot Callouses     New Patient is here today for foot calluses ALPESH. Started many years ago. PCP referred. Went to many Dr's and many different treatments for the calluses ALPESH. Tried OTC medications with no relief.     C/O painful corns bilaterally.  Pain x years.  Patient \"digs them out every so often\".  Tried salicyclic acid sometimes, is hard to get out each individual core, causes irritation.  Has had the skin lesions frozen in the past.    PMH, PSx, Medications and allergies reviewed.  ROS negative except for what is stated in HPI.    Phyiscal Exam  Patient alert, oriented, no acute distress    VASC: +2/4 pedal pulses B/L.  CFT brisk all digits.  Feet warm to touch.  (+)hair growth B/L.   No edema noted B/L    NEURO: Vibratory intact B/L.  Light touch intact B/L.     DERM: Multiple small pre-ulcerative, painful, hyperkeratotic lesions with deep cores located: There is 14 lesions located underlying the plantar forefeet of each foot, clustered around the second metatarsal head and a large lesion on the plantar right heel and 3 small lesions on the plantar left heel.  No cellulitis is appreciated.  Overall mild skin xerosis noted at the plantar heels bilaterally.  No cellulitis noted bilaterally.     MUSCULOSKEL: +5/5 muscle strength B/L.  Full 1st MPJ and ankle joint and STJ ROM B/L.    Assessment and Plan  #1  Multiple corns bilateral foot  Discussed pathology treatment options  Debrided all skin lesions with an #86 blade  Topical trichloroacetic acid and occlusive bandage applied to all lesions  Leave intact up to 24 hours  Rx: ammonium lactate  Follow-up 3 months    Prolonged visit, patient care > 30 mins.      "

## 2024-03-15 DIAGNOSIS — L85.1 ACQUIRED KERATODERMA: ICD-10-CM

## 2024-03-15 RX ORDER — AMMONIUM LACTATE 12 G/100G
1 LOTION TOPICAL DAILY
Qty: 225 G | Refills: 2 | Status: SHIPPED | OUTPATIENT
Start: 2024-03-15

## 2024-03-15 RX ORDER — AMMONIUM LACTATE 12 G/100G
1 LOTION TOPICAL DAILY
COMMUNITY
End: 2024-03-15 | Stop reason: SDUPTHER

## 2024-04-01 ENCOUNTER — OFFICE VISIT (OUTPATIENT)
Dept: PRIMARY CARE | Facility: CLINIC | Age: 79
End: 2024-04-01
Payer: MEDICARE

## 2024-04-01 VITALS
OXYGEN SATURATION: 98 % | HEART RATE: 76 BPM | BODY MASS INDEX: 22.88 KG/M2 | SYSTOLIC BLOOD PRESSURE: 130 MMHG | WEIGHT: 134 LBS | HEIGHT: 64 IN | DIASTOLIC BLOOD PRESSURE: 64 MMHG

## 2024-04-01 DIAGNOSIS — N95.1 MENOPAUSAL SYMPTOM: ICD-10-CM

## 2024-04-01 DIAGNOSIS — J30.0 VASOMOTOR RHINITIS: ICD-10-CM

## 2024-04-01 DIAGNOSIS — H69.92 EUSTACHIAN TUBE DYSFUNCTION, LEFT: Primary | ICD-10-CM

## 2024-04-01 PROCEDURE — 1159F MED LIST DOCD IN RCRD: CPT | Performed by: STUDENT IN AN ORGANIZED HEALTH CARE EDUCATION/TRAINING PROGRAM

## 2024-04-01 PROCEDURE — 99214 OFFICE O/P EST MOD 30 MIN: CPT | Performed by: STUDENT IN AN ORGANIZED HEALTH CARE EDUCATION/TRAINING PROGRAM

## 2024-04-01 PROCEDURE — 1157F ADVNC CARE PLAN IN RCRD: CPT | Performed by: STUDENT IN AN ORGANIZED HEALTH CARE EDUCATION/TRAINING PROGRAM

## 2024-04-01 PROCEDURE — 1036F TOBACCO NON-USER: CPT | Performed by: STUDENT IN AN ORGANIZED HEALTH CARE EDUCATION/TRAINING PROGRAM

## 2024-04-01 PROCEDURE — 1160F RVW MEDS BY RX/DR IN RCRD: CPT | Performed by: STUDENT IN AN ORGANIZED HEALTH CARE EDUCATION/TRAINING PROGRAM

## 2024-04-01 RX ORDER — AZELASTINE 1 MG/ML
1 SPRAY, METERED NASAL 2 TIMES DAILY
Qty: 30 ML | Refills: 12 | Status: SHIPPED | OUTPATIENT
Start: 2024-04-01 | End: 2025-04-01

## 2024-04-01 RX ORDER — IPRATROPIUM BROMIDE 42 UG/1
2 SPRAY, METERED NASAL DAILY
Qty: 15 ML | Refills: 11 | Status: SHIPPED | OUTPATIENT
Start: 2024-04-01

## 2024-04-01 RX ORDER — METRONIDAZOLE 500 MG/1
TABLET ORAL
COMMUNITY
Start: 2024-03-28

## 2024-04-01 RX ORDER — METHYLPREDNISOLONE 4 MG/1
TABLET ORAL
Qty: 21 TABLET | Refills: 0 | Status: SHIPPED | OUTPATIENT
Start: 2024-04-01 | End: 2024-04-08

## 2024-04-01 ASSESSMENT — ENCOUNTER SYMPTOMS
GASTROINTESTINAL NEGATIVE: 1
RESPIRATORY NEGATIVE: 1
MUSCULOSKELETAL NEGATIVE: 1
NEUROLOGICAL NEGATIVE: 1
PSYCHIATRIC NEGATIVE: 1
CARDIOVASCULAR NEGATIVE: 1
CONSTITUTIONAL NEGATIVE: 1

## 2024-04-01 ASSESSMENT — PATIENT HEALTH QUESTIONNAIRE - PHQ9
SUM OF ALL RESPONSES TO PHQ9 QUESTIONS 1 AND 2: 0
1. LITTLE INTEREST OR PLEASURE IN DOING THINGS: NOT AT ALL
2. FEELING DOWN, DEPRESSED OR HOPELESS: NOT AT ALL

## 2024-04-01 NOTE — PROGRESS NOTES
Subjective   Patient ID: Maria Luisa Tirado is a 78 y.o. female who presents for Follow-up (Left ear ache-Has been having nerve pain and discomfort in left arm since getting her rsv vaccine. Face, throat, jaw and other areas of face on left side have hurt since then as well. Saw her dentist and they ended up giving her Flagyl).  Left ear ache, since February. More pain at night.     Was also having left facial pain including throat and jaw. Was started on flagyl by her dentist which has helped jaw slightly but still noticing ear pain.     Left arm hurt for several days, hard time lifting her left arm for about month. This has since resolved.     Her OB changed her hormone patch to progesterone. She was having hair loss so she discontinued. Open to trying Veozah.     Recently started xyzal at bedtime.     PT helped her shoulder pain. Doing her stretches at home.     No other concerns today.         Review of Systems   Constitutional: Negative.    HENT:  Positive for congestion and ear pain.    Respiratory: Negative.     Cardiovascular: Negative.    Gastrointestinal: Negative.    Endocrine: Positive for heat intolerance.   Musculoskeletal: Negative.    Neurological: Negative.    Psychiatric/Behavioral: Negative.         Objective   Physical Exam  Vitals reviewed.   Constitutional:       Appearance: Normal appearance.   HENT:      Head: Normocephalic.      Right Ear: Tympanic membrane, ear canal and external ear normal. There is no impacted cerumen.      Left Ear: Ear canal and external ear normal. There is no impacted cerumen.      Ears:      Comments: Bilateral TM bulging, no fluid or purulent material     Nose: Congestion present.   Eyes:      Pupils: Pupils are equal, round, and reactive to light.   Cardiovascular:      Rate and Rhythm: Normal rate and regular rhythm.   Pulmonary:      Effort: Pulmonary effort is normal. No respiratory distress.      Breath sounds: Normal breath sounds. No wheezing or rhonchi.    Musculoskeletal:         General: Normal range of motion.   Skin:     General: Skin is warm and dry.   Neurological:      General: No focal deficit present.      Mental Status: She is alert. Mental status is at baseline.   Psychiatric:         Mood and Affect: Mood normal.         Behavior: Behavior normal.         Body mass index is 23 kg/m².      Current Outpatient Medications:     ammonium lactate (Lac-Hydrin) 12 % lotion, Apply 1 Application topically once daily., Disp: 225 g, Rfl: 2    cholecalciferol (Vitamin D-3) 5,000 Units tablet, Take 1 tablet (5,000 Units) by mouth once daily., Disp: , Rfl:     fluocinolone 0.01 % cream, Topical, TID, Refill(s) 0, Date: 09/10/2018 15:52:00 EDT, Disp: , Rfl:     fluticasone (Flonase) 50 mcg/actuation nasal spray, Administer 1 spray into affected nostril(s) once daily., Disp: , Rfl:     gabapentin (Neurontin) 100 mg capsule, Take 1 capsule (100 mg) by mouth once daily at bedtime., Disp: 90 capsule, Rfl: 0    metroNIDAZOLE (Flagyl) 500 mg tablet, TAKE ONE TABLET BY MOUTH EVERY 8 HOURS FOR 7 DAYS, Disp: , Rfl:     azelastine (Astelin) 137 mcg (0.1 %) nasal spray, Administer 1 spray into each nostril 2 times a day. Use in each nostril as directed, Disp: 30 mL, Rfl: 12    fezolinetant 45 mg tablet, Take 45 mg by mouth once daily., Disp: 30 tablet, Rfl: 2    ipratropium (Atrovent) 42 mcg (0.06 %) nasal spray, Administer 2 sprays into each nostril once daily., Disp: 15 mL, Rfl: 11    methylPREDNISolone (Medrol Dospak) 4 mg tablets, Take as directed on package., Disp: 21 tablet, Rfl: 0      Assessment/Plan   Diagnoses and all orders for this visit:  Eustachian tube dysfunction, left  Comments:  steroid and azelastine  Orders:  -     methylPREDNISolone (Medrol Dospak) 4 mg tablets; Take as directed on package.  -     azelastine (Astelin) 137 mcg (0.1 %) nasal spray; Administer 1 spray into each nostril 2 times a day. Use in each nostril as directed  Menopausal  symptom  Comments:  was having hair loss on higher progesterone dose  will try veozah  Orders:  -     fezolinetant 45 mg tablet; Take 45 mg by mouth once daily.  Vasomotor rhinitis  -     ipratropium (Atrovent) 42 mcg (0.06 %) nasal spray; Administer 2 sprays into each nostril once daily.    Follow up in 6 months or sooner as needed       Ysabel Ignacio,  04/01/24 4:44 PM

## 2024-04-04 ENCOUNTER — DOCUMENTATION (OUTPATIENT)
Dept: PRIMARY CARE | Facility: CLINIC | Age: 79
End: 2024-04-04
Payer: MEDICARE

## 2024-04-04 DIAGNOSIS — N95.1 MENOPAUSAL SYMPTOM: ICD-10-CM

## 2024-04-04 RX ORDER — FEZOLINETANT 45 MG/1
1 TABLET, FILM COATED ORAL DAILY
Qty: 14 TABLET | Refills: 0 | COMMUNITY
Start: 2024-04-04

## 2024-06-12 ENCOUNTER — APPOINTMENT (OUTPATIENT)
Dept: PODIATRY | Facility: CLINIC | Age: 79
End: 2024-06-12
Payer: MEDICARE

## 2024-06-12 DIAGNOSIS — M79.671 PAIN IN BOTH FEET: ICD-10-CM

## 2024-06-12 DIAGNOSIS — L84 CORN OF FOOT: Primary | ICD-10-CM

## 2024-06-12 DIAGNOSIS — M79.672 PAIN IN BOTH FEET: ICD-10-CM

## 2024-06-12 PROCEDURE — 1157F ADVNC CARE PLAN IN RCRD: CPT | Performed by: PODIATRIST

## 2024-06-12 PROCEDURE — 1159F MED LIST DOCD IN RCRD: CPT | Performed by: PODIATRIST

## 2024-06-12 PROCEDURE — 99213 OFFICE O/P EST LOW 20 MIN: CPT | Performed by: PODIATRIST

## 2024-06-12 PROCEDURE — 1036F TOBACCO NON-USER: CPT | Performed by: PODIATRIST

## 2024-06-12 PROCEDURE — 1160F RVW MEDS BY RX/DR IN RCRD: CPT | Performed by: PODIATRIST

## 2024-06-12 RX ORDER — MEDROXYPROGESTERONE ACETATE 2.5 MG/1
1 TABLET ORAL
COMMUNITY
Start: 2024-03-07

## 2024-06-12 NOTE — PROGRESS NOTES
Chief Complaint   Patient presents with    Foot Callouses     Patient is here today for foot calluses ALPESH     C/O painful corns bilaterally.  Patient is using a pumice stone on the areas and using her ammonium lactate lotion daily.  She said that this is the best reviewed with felt in years.  Wounds have now returned and are causing pain with weightbearing.  Patient tolerated the topical acid and occlusive bandage well.    Phyiscal Exam  Patient alert, oriented, no acute distress    VASC: +2/4 pedal pulses B/L.  CFT brisk all digits.  Feet warm to touch.  (+)hair growth B/L.   No edema noted B/L    NEURO: Vibratory intact B/L.  Light touch intact B/L.     DERM: Multiple small pre-ulcerative, painful, hyperkeratotic lesions with deep cores located: There is 14 lesions located each foot underlying the plantar forefeet, clustered around the second metatarsal head and 1 small lesion on the plantar right heel.  Lesions on the plantar left heel have resolved.  No cellulitis is appreciated.  No skin xerosis noted bilaterally.  No cellulitis noted bilaterally.     MUSCULOSKEL: +5/5 muscle strength B/L.  Full 1st MPJ and ankle joint and STJ ROM B/L.    Assessment and Plan  #1  Multiple corns bilateral foot  Debrided all skin lesions with an #86 blade  Topical trichloroacetic acid and occlusive bandage applied to all lesions  Leave intact up to 24 hours  Continue with ammonium lactate  Follow-up 3 months

## 2024-07-17 ENCOUNTER — APPOINTMENT (OUTPATIENT)
Dept: PRIMARY CARE | Facility: CLINIC | Age: 79
End: 2024-07-17
Payer: MEDICARE

## 2024-07-17 VITALS
HEART RATE: 68 BPM | HEIGHT: 64 IN | BODY MASS INDEX: 21.68 KG/M2 | WEIGHT: 127 LBS | OXYGEN SATURATION: 100 % | DIASTOLIC BLOOD PRESSURE: 70 MMHG | SYSTOLIC BLOOD PRESSURE: 110 MMHG

## 2024-07-17 DIAGNOSIS — R73.9 HYPERGLYCEMIA: ICD-10-CM

## 2024-07-17 DIAGNOSIS — M19.90 ARTHRITIS: Primary | ICD-10-CM

## 2024-07-17 DIAGNOSIS — Z13.220 LIPID SCREENING: ICD-10-CM

## 2024-07-17 DIAGNOSIS — N95.1 MENOPAUSAL SYMPTOM: Chronic | ICD-10-CM

## 2024-07-17 DIAGNOSIS — Z13.29 THYROID DISORDER SCREENING: ICD-10-CM

## 2024-07-17 DIAGNOSIS — J30.0 VASOMOTOR RHINITIS: Chronic | ICD-10-CM

## 2024-07-17 LAB
ALBUMIN SERPL BCP-MCNC: 4.2 G/DL (ref 3.4–5)
ALP SERPL-CCNC: 104 U/L (ref 33–136)
ALT SERPL W P-5'-P-CCNC: 21 U/L (ref 7–45)
ANION GAP SERPL CALC-SCNC: 14 MMOL/L (ref 10–20)
AST SERPL W P-5'-P-CCNC: 25 U/L (ref 9–39)
BILIRUB SERPL-MCNC: 1 MG/DL (ref 0–1.2)
BUN SERPL-MCNC: 19 MG/DL (ref 6–23)
CALCIUM SERPL-MCNC: 10 MG/DL (ref 8.6–10.6)
CHLORIDE SERPL-SCNC: 105 MMOL/L (ref 98–107)
CHOLEST SERPL-MCNC: 191 MG/DL (ref 0–199)
CHOLESTEROL/HDL RATIO: 2.2
CO2 SERPL-SCNC: 29 MMOL/L (ref 21–32)
CREAT SERPL-MCNC: 0.78 MG/DL (ref 0.5–1.05)
EGFRCR SERPLBLD CKD-EPI 2021: 79 ML/MIN/1.73M*2
EST. AVERAGE GLUCOSE BLD GHB EST-MCNC: 117 MG/DL
GLUCOSE SERPL-MCNC: 90 MG/DL (ref 74–99)
HBA1C MFR BLD: 5.7 %
HDLC SERPL-MCNC: 88.4 MG/DL
LDLC SERPL CALC-MCNC: 92 MG/DL
NON HDL CHOLESTEROL: 103 MG/DL (ref 0–149)
POTASSIUM SERPL-SCNC: 5 MMOL/L (ref 3.5–5.3)
PROT SERPL-MCNC: 6.6 G/DL (ref 6.4–8.2)
SODIUM SERPL-SCNC: 143 MMOL/L (ref 136–145)
TRIGL SERPL-MCNC: 53 MG/DL (ref 0–149)
TSH SERPL-ACNC: 1.48 MIU/L (ref 0.44–3.98)
VLDL: 11 MG/DL (ref 0–40)

## 2024-07-17 PROCEDURE — 1159F MED LIST DOCD IN RCRD: CPT | Performed by: STUDENT IN AN ORGANIZED HEALTH CARE EDUCATION/TRAINING PROGRAM

## 2024-07-17 PROCEDURE — 99214 OFFICE O/P EST MOD 30 MIN: CPT | Performed by: STUDENT IN AN ORGANIZED HEALTH CARE EDUCATION/TRAINING PROGRAM

## 2024-07-17 PROCEDURE — 1160F RVW MEDS BY RX/DR IN RCRD: CPT | Performed by: STUDENT IN AN ORGANIZED HEALTH CARE EDUCATION/TRAINING PROGRAM

## 2024-07-17 PROCEDURE — 36415 COLL VENOUS BLD VENIPUNCTURE: CPT

## 2024-07-17 PROCEDURE — 1157F ADVNC CARE PLAN IN RCRD: CPT | Performed by: STUDENT IN AN ORGANIZED HEALTH CARE EDUCATION/TRAINING PROGRAM

## 2024-07-17 PROCEDURE — 1036F TOBACCO NON-USER: CPT | Performed by: STUDENT IN AN ORGANIZED HEALTH CARE EDUCATION/TRAINING PROGRAM

## 2024-07-17 ASSESSMENT — ENCOUNTER SYMPTOMS
DYSPHORIC MOOD: 0
SLEEP DISTURBANCE: 1
NERVOUS/ANXIOUS: 0
LIGHT-HEADEDNESS: 0
PALPITATIONS: 0
UNEXPECTED WEIGHT CHANGE: 0
CHEST TIGHTNESS: 0
DIARRHEA: 0
ARTHRALGIAS: 1
FATIGUE: 0
SINUS PRESSURE: 0
WHEEZING: 0
SHORTNESS OF BREATH: 0
ABDOMINAL PAIN: 0
DIFFICULTY URINATING: 0
DIZZINESS: 0
CONSTIPATION: 0
HEADACHES: 0
RHINORRHEA: 1

## 2024-07-17 ASSESSMENT — PATIENT HEALTH QUESTIONNAIRE - PHQ9
SUM OF ALL RESPONSES TO PHQ9 QUESTIONS 1 AND 2: 0
SUM OF ALL RESPONSES TO PHQ9 QUESTIONS 1 AND 2: 0
1. LITTLE INTEREST OR PLEASURE IN DOING THINGS: NOT AT ALL
1. LITTLE INTEREST OR PLEASURE IN DOING THINGS: NOT AT ALL
2. FEELING DOWN, DEPRESSED OR HOPELESS: NOT AT ALL
2. FEELING DOWN, DEPRESSED OR HOPELESS: NOT AT ALL

## 2024-07-17 NOTE — PROGRESS NOTES
Subjective   Patient ID: Maria Luisa Tirado is a 78 y.o. female who presents for Follow-up (Follow up /Still having hot flashes- veozah did not work- has stopped taking this ( tried it for 6 weeks) ).  Tried veozah for 6 weeks, no improvement in hot flashes.     Has previously tried HRT, effexor, gabapentin. Has tried gabapentin more than once daily and did not change her symptoms. Has not tried amitriptyline. Not interested in trying other meds at this time.     States her arthritis flares sometimes. Uses voltaren, advil. Not much change.     Very active, walks her dogs regularly. Appetite stable. Eating regularly.     Nose spray helps her rhinitis.     Due for labs.     No other concerns today.         Review of Systems   Constitutional:  Negative for fatigue and unexpected weight change.   HENT:  Positive for rhinorrhea. Negative for congestion, ear pain and sinus pressure.    Eyes:  Negative for visual disturbance.   Respiratory:  Negative for chest tightness, shortness of breath and wheezing.    Cardiovascular:  Negative for chest pain, palpitations and leg swelling.   Gastrointestinal:  Negative for abdominal pain, constipation and diarrhea.   Endocrine: Positive for heat intolerance.   Genitourinary:  Negative for difficulty urinating.   Musculoskeletal:  Positive for arthralgias.   Skin:  Negative for rash.   Neurological:  Negative for dizziness, light-headedness and headaches.   Psychiatric/Behavioral:  Positive for sleep disturbance. Negative for dysphoric mood. The patient is not nervous/anxious.    All other systems reviewed and are negative.      Objective   Physical Exam  Vitals reviewed.   Constitutional:       General: She is not in acute distress.     Appearance: Normal appearance.   HENT:      Head: Normocephalic.   Eyes:      Pupils: Pupils are equal, round, and reactive to light.   Cardiovascular:      Rate and Rhythm: Normal rate and regular rhythm.   Pulmonary:      Effort: Pulmonary effort is  normal. No respiratory distress.   Musculoskeletal:         General: Normal range of motion.      Right lower leg: No edema.      Left lower leg: No edema.   Skin:     General: Skin is warm and dry.   Neurological:      Mental Status: She is alert. Mental status is at baseline.   Psychiatric:         Mood and Affect: Mood normal.         Behavior: Behavior normal.         Body mass index is 21.8 kg/m².      Current Outpatient Medications:     ammonium lactate (Lac-Hydrin) 12 % lotion, Apply 1 Application topically once daily., Disp: 225 g, Rfl: 2    cholecalciferol (Vitamin D-3) 5,000 Units tablet, Take 1 tablet (5,000 Units) by mouth once daily., Disp: , Rfl:     fluocinolone 0.01 % cream, Topical, TID, Refill(s) 0, Date: 09/10/2018 15:52:00 EDT, Disp: , Rfl:     gabapentin (Neurontin) 100 mg capsule, Take 1 capsule (100 mg) by mouth once daily at bedtime., Disp: 90 capsule, Rfl: 0    ipratropium (Atrovent) 42 mcg (0.06 %) nasal spray, Administer 2 sprays into each nostril once daily., Disp: 15 mL, Rfl: 11    medroxyPROGESTERone (Provera) 2.5 mg tablet, Take 1 tablet (2.5 mg) by mouth early in the morning.., Disp: , Rfl:       Assessment/Plan   Diagnoses and all orders for this visit:  Arthritis  Comments:  takes advil daily  topicals minimally effective  Lipid screening  -     Lipid Panel  Thyroid disorder screening  -     TSH with reflex to Free T4 if abnormal  Vasomotor rhinitis  Comments:  ipratoprium bromide helping  Menopausal symptom  Comments:  veozah ineffective  HRT patch changed due to side effects  gabapentin, effexor ineffective  not interested in Amitriptyline at this time  Hyperglycemia  -     Comprehensive Metabolic Panel  -     Hemoglobin A1c    Follow up in 6 months for medicare wellness       Ysabel Ignacio DO 07/17/24 11:00 AM

## 2024-08-21 ENCOUNTER — APPOINTMENT (OUTPATIENT)
Dept: PODIATRY | Facility: CLINIC | Age: 79
End: 2024-08-21
Payer: MEDICARE

## 2024-08-21 DIAGNOSIS — M79.671 PAIN IN BOTH FEET: ICD-10-CM

## 2024-08-21 DIAGNOSIS — L84 CORN OF FOOT: Primary | ICD-10-CM

## 2024-08-21 DIAGNOSIS — M79.672 PAIN IN BOTH FEET: ICD-10-CM

## 2024-08-21 PROCEDURE — 99213 OFFICE O/P EST LOW 20 MIN: CPT | Performed by: PODIATRIST

## 2024-08-21 NOTE — PROGRESS NOTES
Chief Complaint   Patient presents with    Foot Callouses     Patient is here today for foot calluses ALPESH     HPI: C/O painful corns bilaterally.  Patient is using a pumice stone on the areas and using her ammonium lactate lotion daily.  Patient tolerated the topical acid and occlusive bandage well.    Phyiscal Exam  Patient alert, oriented, no acute distress    VASC: +2/4 pedal pulses B/L.  CFT brisk all digits.  Feet warm to touch.  (+)hair growth B/L.   No edema noted B/L    NEURO: Vibratory intact B/L.  Light touch intact B/L.     DERM: Multiple small pre-ulcerative, painful, hyperkeratotic lesions with deep cores located: There is 14 lesions located each foot underlying the plantar forefeet, clustered around the second metatarsal head.  Larger callus with no cord noted on the dorsal aspect of the fifth left digit 0.5 cm in size.  No cellulitis is appreciated.  No skin xerosis noted bilaterally.  No cellulitis noted bilaterally.     MUSCULOSKEL: +5/5 muscle strength B/L.  Full 1st MPJ and ankle joint and STJ ROM B/L.    Assessment and Plan  #1  Multiple corns bilateral foot  Paring of all hyperkeratotic skin with an #86 blade without complications  Topical trichloroacetic acid and occlusive bandage applied to all plantar lesions  Leave intact up to 24 hours  Continue with ammonium lactate  Follow-up 3 months

## 2024-09-18 ENCOUNTER — TELEPHONE (OUTPATIENT)
Dept: PRIMARY CARE | Facility: CLINIC | Age: 79
End: 2024-09-18
Payer: MEDICARE

## 2024-09-18 DIAGNOSIS — U07.1 COVID-19: Primary | ICD-10-CM

## 2024-09-18 RX ORDER — NIRMATRELVIR AND RITONAVIR 300-100 MG
3 KIT ORAL 2 TIMES DAILY
Qty: 30 TABLET | Refills: 0 | Status: SHIPPED | OUTPATIENT
Start: 2024-09-18 | End: 2024-09-23

## 2024-09-18 NOTE — PROGRESS NOTES
Subjective   Patient ID: Maria Luisa Tirado is a 78 y.o. female who presents for No chief complaint on file..  HPI    Review of Systems    Objective   Physical Exam    Assessment/Plan            Ysabel Ignacio DO 09/18/24 4:02 PM

## 2024-10-28 ENCOUNTER — HOSPITAL ENCOUNTER (OUTPATIENT)
Dept: RADIOLOGY | Facility: CLINIC | Age: 79
Discharge: HOME | End: 2024-10-28
Payer: MEDICARE

## 2024-10-28 VITALS — WEIGHT: 126.98 LBS | HEIGHT: 64 IN | BODY MASS INDEX: 21.68 KG/M2

## 2024-10-28 DIAGNOSIS — Z12.31 ENCOUNTER FOR SCREENING MAMMOGRAM FOR MALIGNANT NEOPLASM OF BREAST: ICD-10-CM

## 2024-10-28 PROCEDURE — 77063 BREAST TOMOSYNTHESIS BI: CPT | Performed by: RADIOLOGY

## 2024-10-28 PROCEDURE — 77067 SCR MAMMO BI INCL CAD: CPT

## 2024-10-28 PROCEDURE — 77067 SCR MAMMO BI INCL CAD: CPT | Performed by: RADIOLOGY

## 2024-11-06 ENCOUNTER — PROCEDURE VISIT (OUTPATIENT)
Dept: PODIATRY | Facility: CLINIC | Age: 79
End: 2024-11-06
Payer: MEDICARE

## 2024-11-06 DIAGNOSIS — L84 CORN OF FOOT: Primary | ICD-10-CM

## 2024-11-06 DIAGNOSIS — M79.672 PAIN IN BOTH FEET: ICD-10-CM

## 2024-11-06 DIAGNOSIS — M79.671 PAIN IN BOTH FEET: ICD-10-CM

## 2024-11-06 PROCEDURE — 99213 OFFICE O/P EST LOW 20 MIN: CPT | Performed by: PODIATRIST

## 2024-11-06 NOTE — PROGRESS NOTES
Chief Complaint   Patient presents with    nail care     Patient is here for routine nail care     HPI: C/O painful corns bilaterally.  Patient is using a pumice stone on the areas and using her ammonium lactate lotion daily.  Patient tolerated the topical acid and occlusive bandage well.    Phyiscal Exam  Patient alert, oriented, no acute distress    VASC: +2/4 pedal pulses B/L.  CFT brisk all digits.  Feet warm to touch.  (+)hair growth B/L.   No edema noted B/L    NEURO: Vibratory intact B/L.  Light touch intact B/L.     DERM: Multiple small pre-ulcerative, painful, hyperkeratotic lesions with deep cores located: There is 12 lesions located R foot and 10 lesions L foot underlying the plantar forefeet, clustered around the second metatarsal head.  Larger callus with no core noted on the dorsal aspect of the fifth left digit 0.5 cm in size.  No cellulitis is appreciated.  No skin xerosis noted bilaterally.  No cellulitis noted bilaterally.     MUSCULOSKEL: +5/5 muscle strength B/L.  Full 1st MPJ and ankle joint and STJ ROM B/L.    Assessment and Plan  #1  Multiple corns bilateral foot  Paring of all hyperkeratotic skin with an #86 blade without complications  Topical trichloroacetic acid and occlusive bandage applied to all plantar lesions  Leave intact up to 24 hours  Continue with ammonium lactate  Follow-up 3 months

## 2025-01-14 ENCOUNTER — TELEPHONE (OUTPATIENT)
Dept: GASTROENTEROLOGY | Facility: CLINIC | Age: 80
End: 2025-01-14
Payer: MEDICARE

## 2025-01-14 DIAGNOSIS — Z12.11 COLON CANCER SCREENING: Primary | ICD-10-CM

## 2025-01-15 ENCOUNTER — APPOINTMENT (OUTPATIENT)
Dept: PRIMARY CARE | Facility: CLINIC | Age: 80
End: 2025-01-15
Payer: MEDICARE

## 2025-01-15 VITALS
HEIGHT: 64 IN | SYSTOLIC BLOOD PRESSURE: 128 MMHG | DIASTOLIC BLOOD PRESSURE: 68 MMHG | WEIGHT: 131 LBS | HEART RATE: 83 BPM | BODY MASS INDEX: 22.36 KG/M2 | OXYGEN SATURATION: 96 %

## 2025-01-15 DIAGNOSIS — Z12.31 BREAST CANCER SCREENING BY MAMMOGRAM: ICD-10-CM

## 2025-01-15 DIAGNOSIS — M54.2 CERVICALGIA: Chronic | ICD-10-CM

## 2025-01-15 DIAGNOSIS — F41.9 ANXIETY: Primary | ICD-10-CM

## 2025-01-15 DIAGNOSIS — F43.9 STRESS: ICD-10-CM

## 2025-01-15 PROCEDURE — 1157F ADVNC CARE PLAN IN RCRD: CPT | Performed by: STUDENT IN AN ORGANIZED HEALTH CARE EDUCATION/TRAINING PROGRAM

## 2025-01-15 PROCEDURE — 1160F RVW MEDS BY RX/DR IN RCRD: CPT | Performed by: STUDENT IN AN ORGANIZED HEALTH CARE EDUCATION/TRAINING PROGRAM

## 2025-01-15 PROCEDURE — 1036F TOBACCO NON-USER: CPT | Performed by: STUDENT IN AN ORGANIZED HEALTH CARE EDUCATION/TRAINING PROGRAM

## 2025-01-15 PROCEDURE — 99214 OFFICE O/P EST MOD 30 MIN: CPT | Performed by: STUDENT IN AN ORGANIZED HEALTH CARE EDUCATION/TRAINING PROGRAM

## 2025-01-15 PROCEDURE — 1159F MED LIST DOCD IN RCRD: CPT | Performed by: STUDENT IN AN ORGANIZED HEALTH CARE EDUCATION/TRAINING PROGRAM

## 2025-01-15 RX ORDER — GABAPENTIN 100 MG/1
100 CAPSULE ORAL NIGHTLY
Qty: 90 CAPSULE | Refills: 3 | Status: SHIPPED | OUTPATIENT
Start: 2025-01-15

## 2025-01-15 RX ORDER — DESVENLAFAXINE 50 MG/1
50 TABLET, EXTENDED RELEASE ORAL DAILY
Qty: 30 TABLET | Refills: 11 | Status: SHIPPED | OUTPATIENT
Start: 2025-01-15 | End: 2026-01-15

## 2025-01-15 ASSESSMENT — ENCOUNTER SYMPTOMS
MUSCULOSKELETAL NEGATIVE: 1
GASTROINTESTINAL NEGATIVE: 1
CHEST TIGHTNESS: 0
SHORTNESS OF BREATH: 0
FATIGUE: 0
DIZZINESS: 0
DYSPHORIC MOOD: 0
SLEEP DISTURBANCE: 0
HEADACHES: 0
ACTIVITY CHANGE: 0

## 2025-01-15 NOTE — PROGRESS NOTES
Subjective   Patient ID: Maria Luisa Tirado is a 79 y.o. female who presents for Follow-up (6 month follow up /Med refills).  Her husbands health has been poor the last 6 months. Has been in and out of the hospital. This has been stressful for her.     Her anxiety and stress have been worse lately. Her daughter gave her 10mg prozac to try. States this helped but thinks this caused some weight gain. Not sure if the holidays also contributed to this.     She is a retired critical care nurse. Has good support from her children.     Sleeping okay.     Appetite is okay.     Gabapentin helping her back pain.     No other concerns today.           Review of Systems   Constitutional:  Negative for activity change and fatigue.   HENT: Negative.     Respiratory:  Negative for chest tightness and shortness of breath.    Cardiovascular:  Negative for chest pain.   Gastrointestinal: Negative.    Musculoskeletal: Negative.    Neurological:  Negative for dizziness and headaches.   Psychiatric/Behavioral:  Negative for dysphoric mood and sleep disturbance.    All other systems reviewed and are negative.      Objective   Physical Exam  Vitals reviewed.   Constitutional:       General: She is not in acute distress.     Appearance: Normal appearance.   HENT:      Head: Normocephalic.   Eyes:      Pupils: Pupils are equal, round, and reactive to light.   Cardiovascular:      Rate and Rhythm: Normal rate and regular rhythm.   Pulmonary:      Effort: Pulmonary effort is normal. No respiratory distress.   Musculoskeletal:         General: Normal range of motion.   Skin:     General: Skin is warm and dry.   Neurological:      Mental Status: She is alert. Mental status is at baseline.   Psychiatric:         Mood and Affect: Mood normal.         Behavior: Behavior normal.         Body mass index is 22.47 kg/m².      Current Outpatient Medications:     ammonium lactate (Lac-Hydrin) 12 % lotion, Apply 1 Application topically once daily., Disp: 225  g, Rfl: 2    cholecalciferol (Vitamin D-3) 5,000 Units tablet, Take 1 tablet (5,000 Units) by mouth once daily., Disp: , Rfl:     fluocinolone 0.01 % cream, Topical, TID, Refill(s) 0, Date: 09/10/2018 15:52:00 EDT, Disp: , Rfl:     ipratropium (Atrovent) 42 mcg (0.06 %) nasal spray, Administer 2 sprays into each nostril once daily., Disp: 15 mL, Rfl: 11    desvenlafaxine 50 mg 24 hr tablet, Take 1 tablet (50 mg) by mouth once daily. Do not crush, chew, or split., Disp: 30 tablet, Rfl: 11    gabapentin (Neurontin) 100 mg capsule, Take 1 capsule (100 mg) by mouth once daily at bedtime., Disp: 90 capsule, Rfl: 3    medroxyPROGESTERone (Provera) 2.5 mg tablet, Take 1 tablet (2.5 mg) by mouth early in the morning.. (Patient not taking: Reported on 1/15/2025), Disp: , Rfl:     Assessment/Plan   Diagnoses and all orders for this visit:  Anxiety  Comments:  start pristiq, concerned about weight gain  reassess  has also tried effexor and prozac in the past  Orders:  -     desvenlafaxine 50 mg 24 hr tablet; Take 1 tablet (50 mg) by mouth once daily. Do not crush, chew, or split.  Cervicalgia  Comments:  doing well on gabapentin  Orders:  -     gabapentin (Neurontin) 100 mg capsule; Take 1 capsule (100 mg) by mouth once daily at bedtime.  Breast cancer screening by mammogram  -     BI mammo bilateral screening tomosynthesis; Future  Stress       Follow up in 6 months or sooner as needed    Ysabel Ignacio,  01/15/25 12:33 PM

## 2025-01-20 RX ORDER — SOD SULF/POT CHLORIDE/MAG SULF 1.479 G
12 TABLET ORAL 2 TIMES DAILY
Qty: 24 TABLET | Refills: 0 | Status: SHIPPED | OUTPATIENT
Start: 2025-01-20

## 2025-02-12 ENCOUNTER — APPOINTMENT (OUTPATIENT)
Dept: PODIATRY | Facility: CLINIC | Age: 80
End: 2025-02-12
Payer: MEDICARE

## 2025-02-19 ENCOUNTER — PROCEDURE VISIT (OUTPATIENT)
Dept: PODIATRY | Facility: CLINIC | Age: 80
End: 2025-02-19
Payer: MEDICARE

## 2025-02-19 DIAGNOSIS — L85.1 ACQUIRED KERATODERMA: Primary | ICD-10-CM

## 2025-02-19 DIAGNOSIS — M79.672 PAIN IN BOTH FEET: ICD-10-CM

## 2025-02-19 DIAGNOSIS — M79.671 PAIN IN BOTH FEET: ICD-10-CM

## 2025-02-19 PROCEDURE — 99214 OFFICE O/P EST MOD 30 MIN: CPT | Performed by: PODIATRIST

## 2025-02-19 ASSESSMENT — ENCOUNTER SYMPTOMS
DEPRESSION: 0
LOSS OF SENSATION IN FEET: 0
OCCASIONAL FEELINGS OF UNSTEADINESS: 0

## 2025-02-19 NOTE — PROGRESS NOTES
Chief Complaint   Patient presents with    NAIL CARE     PATIENT HERE FOR NAIL CARE     HPI: C/O painful corns bilaterally.  Patient missed her appointment last week.  Patient is overwhelmed by taking care of her , has been neglecting her feet.  Patient tolerated the topical acid and occlusive bandage well.  Patient does have a dermatology appointment next month with Dr. Bartlett for a different issue.    Phyiscal Exam  Patient alert, oriented, no acute distress    VASC: +2/4 pedal pulses B/L.  CFT brisk all digits.  Feet warm to touch.  (+)hair growth B/L.   No edema noted B/L    NEURO: Vibratory intact B/L.  Light touch intact B/L.     DERM: Multiple small pre-ulcerative, painful, hyperkeratotic lesions with deep cores located: There is 12 lesions located R foot and 11 lesions L foot mainly underlying the plantar forefeet, clustered around the second metatarsal head.  There is 1 large solitary lesion on the plantar right heel and 3 lesions on the plantar left heel.  No cellulitis is appreciated.  No skin xerosis noted bilaterally.  No cellulitis noted bilaterally.     MUSCULOSKEL: +5/5 muscle strength B/L.  Full 1st MPJ and ankle joint and STJ ROM B/L.    Assessment and Plan  #1  Multiple IPKs B/L foot  Paring of all hyperkeratotic skin with an #86 blade   Neosporin and bandage was applied to the heel lesions on the left foot where scant bleeding was noted after debridement.  This area was flushed with copious amounts of hydrogen peroxide.  Topical trichloroacetic acid and occlusive bandage applied to all remaining plantar lesions  Leave intact up to 24 hours  Continue with ammonium lactate  Follow-up 3 months  Referred to dermatology possible vitamin A treatments

## 2025-03-17 ENCOUNTER — ANESTHESIA EVENT (OUTPATIENT)
Dept: GASTROENTEROLOGY | Facility: HOSPITAL | Age: 80
End: 2025-03-17
Payer: MEDICARE

## 2025-03-17 ENCOUNTER — ANESTHESIA (OUTPATIENT)
Dept: GASTROENTEROLOGY | Facility: HOSPITAL | Age: 80
End: 2025-03-17
Payer: MEDICARE

## 2025-03-17 ENCOUNTER — HOSPITAL ENCOUNTER (OUTPATIENT)
Dept: GASTROENTEROLOGY | Facility: HOSPITAL | Age: 80
Discharge: HOME | End: 2025-03-17
Payer: MEDICARE

## 2025-03-17 VITALS
BODY MASS INDEX: 22.36 KG/M2 | WEIGHT: 130.95 LBS | SYSTOLIC BLOOD PRESSURE: 121 MMHG | RESPIRATION RATE: 18 BRPM | HEART RATE: 65 BPM | OXYGEN SATURATION: 95 % | HEIGHT: 64 IN | DIASTOLIC BLOOD PRESSURE: 68 MMHG | TEMPERATURE: 97.2 F

## 2025-03-17 DIAGNOSIS — D12.6 COLON ADENOMA: Primary | ICD-10-CM

## 2025-03-17 DIAGNOSIS — Z12.11 ENCOUNTER FOR SCREENING FOR MALIGNANT NEOPLASM OF COLON: ICD-10-CM

## 2025-03-17 PROCEDURE — 3700000002 HC GENERAL ANESTHESIA TIME - EACH INCREMENTAL 1 MINUTE

## 2025-03-17 PROCEDURE — 2500000004 HC RX 250 GENERAL PHARMACY W/ HCPCS (ALT 636 FOR OP/ED): Performed by: NURSE ANESTHETIST, CERTIFIED REGISTERED

## 2025-03-17 PROCEDURE — 7100000010 HC PHASE TWO TIME - EACH INCREMENTAL 1 MINUTE

## 2025-03-17 PROCEDURE — 3700000001 HC GENERAL ANESTHESIA TIME - INITIAL BASE CHARGE

## 2025-03-17 PROCEDURE — A45378 PR COLONOSCOPY,DIAGNOSTIC: Performed by: NURSE ANESTHETIST, CERTIFIED REGISTERED

## 2025-03-17 PROCEDURE — 99100 ANES PT EXTEME AGE<1 YR&>70: CPT | Performed by: ANESTHESIOLOGY

## 2025-03-17 PROCEDURE — A45378 PR COLONOSCOPY,DIAGNOSTIC: Performed by: ANESTHESIOLOGY

## 2025-03-17 PROCEDURE — 7100000009 HC PHASE TWO TIME - INITIAL BASE CHARGE

## 2025-03-17 PROCEDURE — G0105 COLORECTAL SCRN; HI RISK IND: HCPCS | Performed by: INTERNAL MEDICINE

## 2025-03-17 PROCEDURE — 45378 DIAGNOSTIC COLONOSCOPY: CPT | Performed by: INTERNAL MEDICINE

## 2025-03-17 RX ORDER — LIDOCAINE HCL/PF 100 MG/5ML
SYRINGE (ML) INTRAVENOUS AS NEEDED
Status: DISCONTINUED | OUTPATIENT
Start: 2025-03-17 | End: 2025-03-17

## 2025-03-17 RX ORDER — PROPOFOL 10 MG/ML
INJECTION, EMULSION INTRAVENOUS AS NEEDED
Status: DISCONTINUED | OUTPATIENT
Start: 2025-03-17 | End: 2025-03-17

## 2025-03-17 RX ADMIN — PROPOFOL 70 MG: 10 INJECTION, EMULSION INTRAVENOUS at 12:04

## 2025-03-17 RX ADMIN — LIDOCAINE HYDROCHLORIDE 60 MG: 20 INJECTION, SOLUTION INTRAVENOUS at 12:04

## 2025-03-17 RX ADMIN — PROPOFOL 100 MCG/KG/MIN: 10 INJECTION, EMULSION INTRAVENOUS at 12:05

## 2025-03-17 SDOH — HEALTH STABILITY: MENTAL HEALTH: CURRENT SMOKER: 0

## 2025-03-17 ASSESSMENT — PAIN SCALES - GENERAL
PAINLEVEL_OUTOF10: 0 - NO PAIN
PAIN_LEVEL: 0
PAINLEVEL_OUTOF10: 0 - NO PAIN

## 2025-03-17 ASSESSMENT — PAIN - FUNCTIONAL ASSESSMENT
PAIN_FUNCTIONAL_ASSESSMENT: 0-10

## 2025-03-17 NOTE — H&P
Procedure H&P    Patient Profile-Procedures  Name Maria Luisa Cisneros  Date of Birth 1945  MRN [id number]  Address   [address] [id number] [facility] [id number]    Primary Phone Number [phone]  Secondary Phone Number    Michaela Foley    Procedure(s):  Procedures: Colonoscopy  Primary contact name and number   Extended Emergency Contact Information  Primary Emergency Contact: MILY CISNEROS  Address: [address] [facility] [address] [place] [address] [facility]  Home Phone: [phone]  Work Phone: [phone]  Mobile Phone: [phone]  Relation: Spouse   needed? No  Secondary Emergency Contact: CANDELARIA CALIXTO  Mobile Phone: [phone]  Relation: Daughter  Preferred language: English   needed? No    General Health  Weight   Vitals:    03/17/25 1047   Weight: 59.4 kg (130 lb 15.3 oz)     BMI Body mass index is 22.48 kg/m².    Allergies  Allergies   Allergen Reactions    Cephalexin Other    Penicillins Other       Past Medical History   Past Medical History:   Diagnosis Date    Adhesive capsulitis of left shoulder     Adhesive capsulitis of left shoulder    Anxiety disorder, unspecified     Anxiety    Chronic rhinitis 06/18/2015    Rhinitis    Flushing     Vasomotor flushing    Other conditions influencing health status     Reflux    Pain in unspecified hand     Arthralgia of hand    Pain in unspecified wrist     Arthralgia of forearm    Paresthesia of skin     Paresthesia of right foot    Personal history of other diseases of the musculoskeletal system and connective tissue     History of osteopenia    Personal history of other diseases of the musculoskeletal system and connective tissue 02/04/2016    History of osteoarthritis    Personal history of other diseases of the nervous system and sense organs     History of carpal tunnel syndrome    Personal history of other diseases of the respiratory system 09/23/2014    History of acute bronchitis     Personal history of other endocrine, nutritional and metabolic disease     History of hyperlipidemia    Personal history of other specified conditions     History of edema    Personal history of other specified conditions     History of fibrocystic disease of breast    Personal history of other specified conditions     History of ulceration    Personal history of peptic ulcer disease     History of peptic ulcer    Personal history of peptic ulcer disease     Personal history of gastric ulcer    Polyarthritis, unspecified 06/15/2020    Polyarthropathy    Postmenopausal bleeding     Postmenopausal bleeding    Trigger finger, unspecified finger     Trigger finger, right       Provider assessment  Diagnosis: Colon Cancer Screening/Surveillance   Medication Reviewed - yes  Prior to Admission medications    Medication Sig Start Date End Date Taking? Authorizing Provider   ammonium lactate (Lac-Hydrin) 12 % lotion Apply 1 Application topically once daily. 3/15/24  Yes Pretty Rolon DPM   cholecalciferol (Vitamin D-3) 5,000 Units tablet Take 1 tablet (5,000 Units) by mouth once daily.   Yes Historical Provider, MD   desvenlafaxine 50 mg 24 hr tablet Take 1 tablet (50 mg) by mouth once daily. Do not crush, chew, or split. 1/15/25 1/15/26 Yes Ysabel Ignacio, DO   fluocinolone 0.01 % cream Topical, TID, Refill(s) 0, Date: 09/10/2018 15:52:00 EDT 9/10/18  Yes Historical Provider, MD   gabapentin (Neurontin) 100 mg capsule Take 1 capsule (100 mg) by mouth once daily at bedtime. 1/15/25  Yes Ysabel Ignacio DO   ipratropium (Atrovent) 42 mcg (0.06 %) nasal spray Administer 2 sprays into each nostril once daily. 4/1/24  Yes Ysabel Ignacio DO   sod sulf-pot chloride-mag sulf (Sutab) 1.479-0.188- 0.225 gram tablet tablet Take 12 tablets by mouth 2 times a day. 1/20/25  Yes Jonathan Vázquez MD   medroxyPROGESTERone (Provera) 2.5 mg tablet Take 1 tablet (2.5 mg) by mouth early in the morning..  Patient not taking: Reported on  1/15/2025 3/7/24   Historical Provider, MD       Physical Exam  Vitals:    03/17/25 1047   BP: 141/70   Pulse: 80   Resp: 18   Temp: 36 °C (96.8 °F)   SpO2: 98%        General: A&Ox3, NAD.  HEENT: AT/NC.   CV: RRR. No murmur.  Resp: CTA bilaterally. No wheezing, rhonchi or rales.   GI: Soft, NT/ND. BSx4.  Extrem: No edema. Pulses intact.  Neuro: No focal deficits.   Psych: Normal mood and affect.      Procedure Plan - pre-procedural (re)assesment completed by physician:  discharge/transfer patient when discharge criteria met    Jonathan Vázquez MD  3/17/2025 12:01 PM

## 2025-03-17 NOTE — ANESTHESIA PREPROCEDURE EVALUATION
Patient: Maria Luisa Tirado    Procedure Information       Date/Time: 03/17/25 1130    Scheduled providers: Jonathan Vázquez MD; Saman Morton MD    Procedure: COLONOSCOPY    Location: [facility]            Relevant Problems   Anesthesia   (-) Difficult intubation   (-) PONV (postoperative nausea and vomiting)      Neuro   (+) Anxiety      HEENT   (+) Chronic sinusitis       Clinical information reviewed:   Tobacco  Allergies  Meds   Med Hx  Surg Hx   Fam Hx  Soc Hx        NPO Detail:  NPO/Void Status  Date of Last Liquid: 03/17/25  Time of Last Liquid: 0000  Date of Last Solid: 03/15/25  Time of Last Solid: 1900  Last Intake Type: Clear fluids         Physical Exam    Airway  Mallampati: II  TM distance: >3 FB  Neck ROM: full     Cardiovascular - normal exam  Rhythm: regular  Rate: normal     Dental    Pulmonary - normal exam     Abdominal            Anesthesia Plan    History of general anesthesia?: yes  History of complications of general anesthesia?: no    ASA 3     MAC     The patient is not a current smoker.  Education provided regarding risk of obstructive sleep apnea.  intravenous induction   Anesthetic plan and risks discussed with patient.    Plan discussed with CRNA, CAA and attending.

## 2025-03-17 NOTE — ANESTHESIA POSTPROCEDURE EVALUATION
Patient: Maria Luisa Tirado    Procedure Summary       Date: 03/17/25 Room / Location: [facility]    Anesthesia Start: 1200 Anesthesia Stop:     Procedure: COLONOSCOPY Diagnosis: Colon adenoma    Scheduled Providers: Jonathan Vázquez MD; Saman Morton MD Responsible Provider: Saman Morton MD    Anesthesia Type: MAC ASA Status: 3            Anesthesia Type: MAC    Vitals Value Taken Time   /63 03/17/25 1226   Temp 36.2 °C (97.2 °F) 03/17/25 1225   Pulse 70 03/17/25 1227   Resp 16 03/17/25 1225   SpO2 98 % 03/17/25 1227   Vitals shown include unfiled device data.    Anesthesia Post Evaluation    Patient location during evaluation: PACU  Patient participation: complete - patient participated  Level of consciousness: awake and alert  Pain score: 0  Pain management: adequate  Airway patency: patent  Cardiovascular status: acceptable, blood pressure returned to baseline and hemodynamically stable  Respiratory status: acceptable  Hydration status: acceptable  Postoperative Nausea and Vomiting: none        There were no known notable events for this encounter.

## 2025-05-02 DIAGNOSIS — J30.0 VASOMOTOR RHINITIS: ICD-10-CM

## 2025-05-02 RX ORDER — IPRATROPIUM BROMIDE 42 UG/1
2 SPRAY, METERED NASAL DAILY
Qty: 15 ML | Refills: 0 | Status: SHIPPED | OUTPATIENT
Start: 2025-05-02

## 2025-06-11 ENCOUNTER — TELEPHONE (OUTPATIENT)
Dept: PRIMARY CARE | Facility: CLINIC | Age: 80
End: 2025-06-11
Payer: MEDICARE

## 2025-06-11 NOTE — TELEPHONE ENCOUNTER
Pt called she said she think she had TIA last night she said her face felt like it was drooping and today she said that is going back to barbara slowly.Did recommend for her to go to ED. Pt will think about it she said she really didn't want to sit at ED all day.

## 2025-07-15 ENCOUNTER — APPOINTMENT (OUTPATIENT)
Dept: PRIMARY CARE | Facility: CLINIC | Age: 80
End: 2025-07-15
Payer: MEDICARE

## 2025-07-15 VITALS
HEIGHT: 64 IN | SYSTOLIC BLOOD PRESSURE: 104 MMHG | BODY MASS INDEX: 22.02 KG/M2 | OXYGEN SATURATION: 96 % | HEART RATE: 79 BPM | DIASTOLIC BLOOD PRESSURE: 60 MMHG | WEIGHT: 129 LBS

## 2025-07-15 DIAGNOSIS — E78.2 MIXED HYPERLIPIDEMIA: ICD-10-CM

## 2025-07-15 DIAGNOSIS — F41.9 ANXIETY: ICD-10-CM

## 2025-07-15 DIAGNOSIS — M54.2 CERVICALGIA: ICD-10-CM

## 2025-07-15 DIAGNOSIS — Z00.00 MEDICARE ANNUAL WELLNESS VISIT, SUBSEQUENT: Primary | ICD-10-CM

## 2025-07-15 DIAGNOSIS — R73.9 HYPERGLYCEMIA: ICD-10-CM

## 2025-07-15 DIAGNOSIS — J30.0 VASOMOTOR RHINITIS: ICD-10-CM

## 2025-07-15 DIAGNOSIS — M65.331 TRIGGER MIDDLE FINGER OF RIGHT HAND: ICD-10-CM

## 2025-07-15 DIAGNOSIS — R53.83 OTHER FATIGUE: ICD-10-CM

## 2025-07-15 DIAGNOSIS — M13.0 POLYARTHROPATHY: ICD-10-CM

## 2025-07-15 PROCEDURE — 1036F TOBACCO NON-USER: CPT | Performed by: STUDENT IN AN ORGANIZED HEALTH CARE EDUCATION/TRAINING PROGRAM

## 2025-07-15 PROCEDURE — 99214 OFFICE O/P EST MOD 30 MIN: CPT | Performed by: STUDENT IN AN ORGANIZED HEALTH CARE EDUCATION/TRAINING PROGRAM

## 2025-07-15 PROCEDURE — 1124F ACP DISCUSS-NO DSCNMKR DOCD: CPT | Performed by: STUDENT IN AN ORGANIZED HEALTH CARE EDUCATION/TRAINING PROGRAM

## 2025-07-15 PROCEDURE — 1159F MED LIST DOCD IN RCRD: CPT | Performed by: STUDENT IN AN ORGANIZED HEALTH CARE EDUCATION/TRAINING PROGRAM

## 2025-07-15 PROCEDURE — G0439 PPPS, SUBSEQ VISIT: HCPCS | Performed by: STUDENT IN AN ORGANIZED HEALTH CARE EDUCATION/TRAINING PROGRAM

## 2025-07-15 PROCEDURE — 1160F RVW MEDS BY RX/DR IN RCRD: CPT | Performed by: STUDENT IN AN ORGANIZED HEALTH CARE EDUCATION/TRAINING PROGRAM

## 2025-07-15 PROCEDURE — 1170F FXNL STATUS ASSESSED: CPT | Performed by: STUDENT IN AN ORGANIZED HEALTH CARE EDUCATION/TRAINING PROGRAM

## 2025-07-15 RX ORDER — IPRATROPIUM BROMIDE 42 UG/1
2 SPRAY, METERED NASAL DAILY
Qty: 15 ML | Refills: 1 | Status: SHIPPED | OUTPATIENT
Start: 2025-07-15

## 2025-07-15 RX ORDER — INFLUENZA A VIRUS A/VICTORIA/4897/2022 IVR-238 (H1N1) ANTIGEN (FORMALDEHYDE INACTIVATED), INFLUENZA A VIRUS A/THAILAND/8/2022 IVR-237 (H3N2) ANTIGEN (FORMALDEHYDE INACTIVATED), INFLUENZA B VIRUS B/AUSTRIA/1359417/2021 BVR-26 ANTIGEN (FORMALDEHYDE INACTIVATED) 15; 15; 15 UG/.5ML; UG/.5ML; UG/.5ML
INJECTION, SUSPENSION INTRAMUSCULAR
COMMUNITY
Start: 2024-10-31

## 2025-07-15 ASSESSMENT — PATIENT HEALTH QUESTIONNAIRE - PHQ9
SUM OF ALL RESPONSES TO PHQ9 QUESTIONS 1 AND 2: 0
1. LITTLE INTEREST OR PLEASURE IN DOING THINGS: NOT AT ALL
2. FEELING DOWN, DEPRESSED OR HOPELESS: NOT AT ALL
1. LITTLE INTEREST OR PLEASURE IN DOING THINGS: NOT AT ALL
SUM OF ALL RESPONSES TO PHQ9 QUESTIONS 1 AND 2: 0
2. FEELING DOWN, DEPRESSED OR HOPELESS: NOT AT ALL

## 2025-07-15 ASSESSMENT — ACTIVITIES OF DAILY LIVING (ADL)
GROCERY_SHOPPING: INDEPENDENT
DRESSING: INDEPENDENT
MANAGING_FINANCES: INDEPENDENT
DOING_HOUSEWORK: INDEPENDENT
BATHING: INDEPENDENT
TAKING_MEDICATION: INDEPENDENT

## 2025-07-15 NOTE — PROGRESS NOTES
"Subjective   Patient ID: Maria Luisa Tirado is a 79 y.o. female who presents for Medicare Annual Wellness Visit Subsequent (Medicare wellness visit/Arthritis and trouble bending fingers in right hand).  History of Present Illness  The patient presents for arthritis, trigger finger, stress management, and health maintenance.    She describes a transient facial swelling episode with a disappearing line on her face, resolving in a few days without pain or deficits. She has cervical degeneration with persistent discomfort and shooting pain from neck to head, worse on the left. She recalls a jaw and throat infection from 01/2024 but reports no other deficits.    Her arthritis has worsened, with increased swelling and burning pain. She has trigger finger in her right middle finger, limiting bending, , and fist formation, with occasional tenderness. She stretches daily and seeks evaluation by an orthopedic hand specialist.    Stress is managed with Pristiq 50 mg, improving irregular heartbeat and hot flashes. She finds the medication effective and sufficient. No side effects.     She reports no changes in digestion, respiratory issues, headaches, dizziness, or blurry vision. She drinks 1.5 quarts of water daily, avoids soda and alcohol, and maintains regular eye and dental exams. Recent dental deep cleaning improved sensitivity. A normal colonoscopy was performed, with no polyps despite a family history of colorectal cancer in her uncle and grandfather. She may not need another colonoscopy due to her age.    Occupations: Nurse  Coffee/Tea/Caffeine-containing Drinks: Coffee  Living Condition: Lives with her bedbound     FAMILY HISTORY  Uncle and grandfather had colorectal cancer.    Review of Systems  ROS otherwise negative aside from what was mentioned above in HPI.    Objective     /60 (BP Location: Right arm, Patient Position: Sitting, BP Cuff Size: Adult)   Pulse 79   Ht 1.626 m (5' 4\")   Wt 58.5 kg (129 " lb)   SpO2 96%   BMI 22.14 kg/m²      Current Outpatient Medications   Medication Instructions    ammonium lactate (Lac-Hydrin) 12 % lotion 1 Application, Topical, Daily    cholecalciferol (Vitamin D-3) 5,000 Units tablet 1 tablet, Daily    desvenlafaxine (PRISTIQ) 50 mg, oral, Daily, Do not crush, chew, or split.    fluocinolone 0.01 % cream Topical, TID, Refill(s) 0, Date: 09/10/2018 15:52:00 EDT    gabapentin (NEURONTIN) 100 mg, oral, Nightly    ipratropium (Atrovent) 42 mcg (0.06 %) nasal spray 2 sprays, Each Nostril, Daily    medroxyPROGESTERone (Provera) 2.5 mg tablet 1 tablet, Daily (0630)    sod sulf-pot chloride-mag sulf (Sutab) 1.479-0.188- 0.225 gram tablet tablet 12 tablets, oral, 2 times daily       Physical Exam  General Appearance: Normal.  Vital signs: Within normal limits.  HEENT: Within normal limits.  Respiratory: Clear, no wheezing, rales, or rhonchi.  Cardiovascular: Regular rate and rhythm, no murmurs, rubs, or gallops.  Gastrointestinal: Soft, non-tender, no distention or masses.  Back, Musculoskeletal: Swelling and tenderness in the right middle finger consistent with trigger finger.  Skin: Warm and dry, no rash.  Neurological: Normal.  Psychiatric: Normal.    Results      Assessment & Plan  1. Arthritis.  - Worsening arthritis with increased swelling and burning pain.  - Exam shows swelling and difficulty bending the affected finger.  - Referral to Dr. Tanner Milan, orthopedic hand specialist, for evaluation and treatment.    2. Trigger Finger.  - Right middle finger trigger finger with tenderness, affecting  and fist formation.  - Exam confirms tenderness and limited movement.  - Referral to Dr. Tanner Milan for evaluation and potential steroid injections.    3. Stress management.  - Pristiq 50 mg effective for stress, irregular heart rate, and hot flashes.  - Prescription continues with refills until 01/2026.    4. Health Maintenance.  - Mammogram due 10/2025.  - Blood test  ordered for health monitoring.  - Regular eye and dental exams confirmed, with recent deep cleaning improving dental sensitivity.  - Continue current health maintenance practices and follow-up as needed.  -UTD on colonoscopy         Ysabel Ignacio, DO     This medical note was created with the assistance of artificial intelligence (AI) for documentation purposes. The content has been reviewed and confirmed by the healthcare provider for accuracy and completeness. Patient consented to the use of audio recording and use of AI during their visit.

## 2025-07-16 LAB
ALBUMIN SERPL-MCNC: 4.2 G/DL (ref 3.6–5.1)
ALP SERPL-CCNC: 89 U/L (ref 37–153)
ALT SERPL-CCNC: 18 U/L (ref 6–29)
ANION GAP SERPL CALCULATED.4IONS-SCNC: 7 MMOL/L (CALC) (ref 7–17)
AST SERPL-CCNC: 27 U/L (ref 10–35)
BILIRUB SERPL-MCNC: 0.9 MG/DL (ref 0.2–1.2)
BUN SERPL-MCNC: 12 MG/DL (ref 7–25)
CALCIUM SERPL-MCNC: 9.7 MG/DL (ref 8.6–10.4)
CHLORIDE SERPL-SCNC: 103 MMOL/L (ref 98–110)
CHOLEST SERPL-MCNC: 193 MG/DL
CHOLEST/HDLC SERPL: 1.9 (CALC)
CO2 SERPL-SCNC: 30 MMOL/L (ref 20–32)
CREAT SERPL-MCNC: 0.82 MG/DL (ref 0.6–1)
EGFRCR SERPLBLD CKD-EPI 2021: 74 ML/MIN/1.73M2
ERYTHROCYTE [DISTWIDTH] IN BLOOD BY AUTOMATED COUNT: 12.8 % (ref 11–15)
EST. AVERAGE GLUCOSE BLD GHB EST-MCNC: 117 MG/DL
EST. AVERAGE GLUCOSE BLD GHB EST-SCNC: 6.5 MMOL/L
GLUCOSE SERPL-MCNC: 79 MG/DL (ref 65–99)
HBA1C MFR BLD: 5.7 %
HCT VFR BLD AUTO: 44.9 % (ref 35–45)
HDLC SERPL-MCNC: 99 MG/DL
HGB BLD-MCNC: 14.6 G/DL (ref 11.7–15.5)
LDLC SERPL CALC-MCNC: 80 MG/DL (CALC)
MCH RBC QN AUTO: 32.7 PG (ref 27–33)
MCHC RBC AUTO-ENTMCNC: 32.5 G/DL (ref 32–36)
MCV RBC AUTO: 100.7 FL (ref 80–100)
NONHDLC SERPL-MCNC: 94 MG/DL (CALC)
PLATELET # BLD AUTO: 266 THOUSAND/UL (ref 140–400)
PMV BLD REES-ECKER: 11.6 FL (ref 7.5–12.5)
POTASSIUM SERPL-SCNC: 4.5 MMOL/L (ref 3.5–5.3)
PROT SERPL-MCNC: 6.7 G/DL (ref 6.1–8.1)
RBC # BLD AUTO: 4.46 MILLION/UL (ref 3.8–5.1)
SODIUM SERPL-SCNC: 140 MMOL/L (ref 135–146)
TRIGL SERPL-MCNC: 59 MG/DL
TSH SERPL-ACNC: 2.09 MIU/L (ref 0.4–4.5)
WBC # BLD AUTO: 6.6 THOUSAND/UL (ref 3.8–10.8)

## 2025-07-30 ENCOUNTER — OFFICE VISIT (OUTPATIENT)
Dept: ORTHOPEDIC SURGERY | Facility: CLINIC | Age: 80
End: 2025-07-30
Payer: MEDICARE

## 2025-07-30 DIAGNOSIS — M65.331 TRIGGER MIDDLE FINGER OF RIGHT HAND: Primary | ICD-10-CM

## 2025-07-30 PROCEDURE — 2500000004 HC RX 250 GENERAL PHARMACY W/ HCPCS (ALT 636 FOR OP/ED): Performed by: ORTHOPAEDIC SURGERY

## 2025-07-30 PROCEDURE — 1160F RVW MEDS BY RX/DR IN RCRD: CPT | Performed by: ORTHOPAEDIC SURGERY

## 2025-07-30 PROCEDURE — 99202 OFFICE O/P NEW SF 15 MIN: CPT | Performed by: ORTHOPAEDIC SURGERY

## 2025-07-30 PROCEDURE — 20550 NJX 1 TENDON SHEATH/LIGAMENT: CPT | Mod: RT | Performed by: ORTHOPAEDIC SURGERY

## 2025-07-30 PROCEDURE — 99203 OFFICE O/P NEW LOW 30 MIN: CPT | Performed by: ORTHOPAEDIC SURGERY

## 2025-07-30 PROCEDURE — 1036F TOBACCO NON-USER: CPT | Performed by: ORTHOPAEDIC SURGERY

## 2025-07-30 PROCEDURE — 1159F MED LIST DOCD IN RCRD: CPT | Performed by: ORTHOPAEDIC SURGERY

## 2025-07-30 RX ADMIN — TRIAMCINOLONE ACETONIDE 20 MG: 40 INJECTION, SUSPENSION INTRA-ARTICULAR; INTRAMUSCULAR at 13:47

## 2025-07-30 RX ADMIN — LIDOCAINE HYDROCHLORIDE 0.5 ML: 10 INJECTION, SOLUTION INFILTRATION; PERINEURAL at 13:47

## 2025-07-30 NOTE — LETTER
August 5, 2025     Ysabel Ignacio DO  [address] [facility] [address]    Patient: Maria Luisa Tirado   YOB: 1945   Date of Visit: 7/30/2025       Dear Dr. Ysabel Ignacio DO:    Thank you for referring Maria Luisa Tirado to me for evaluation. Below are my notes for this consultation.  If you have questions, please do not hesitate to call me. I look forward to following your patient along with you.       Sincerely,     Tanner Milan MD      CC: No Recipients  ______________________________________________________________________________________    CHIEF COMPLAINT         Trigger finger    ASSESSMENT + PLAN    Right long trigger finger    The nature of trigger finger was reviewed, along with the natural history.  I reviewed the options for management, including observation, nonsteroidals, splinting, oral steroids, cortisone injection, or surgical release, along with the likely success rates of each.  I reviewed the risks of cortisone injection, including infection, hypopigmentation, and fat atrophy.  I cautioned the patient to avoid hot objects where the finger could be burned, if it becomes insensate temporarily from the lidocaine. The transient cortisone effect on blood glucose measurement was also reviewed, and the patient wished to proceed.    After sterile prep, I injected 20 mg of Kenalog and 0.5 cc of 1% lidocaine, plain to the flexor sheath of the right long finger.    Patient will followup in 4 weeks if still symptomatic, or with any concerns.        HISTORY OF PRESENT ILLNESS       Patient is a 79 y.o. right-hand dominant female retiree, who presents today at request of Dr. Ignacio for evaluation of a right long trigger finger.  This has been bothersome for several months.  No single specific recalled trauma around time of onset.  No numbness or tingling.  The episodes are painful and are more frequent in the morning.  She has required use of the opposite hand for extension.  No other bothersome  digits at present.  No particular intervention so far.    She is not diabetic or hypothyroid.  She does not smoke.      REVIEW OF SYSTEMS       A 30-item multi-system Review Of Systems was obtained on today's intake form.  This was reviewed with the patient and is correct.  The pertinent positives and negatives are listed above.  The form has been scanned separately into the medical record.      PHYSICAL EXAM    Constitutional:    Appears stated age. Well-developed and well-nourished female in no acute distress.  Psychiatric:         Pleasant normal mood and affect. Behavior is appropriate for the situation.   Head:                   Normocephalic and atraumatic.  Eyes:                    Pupils are equal and round.  Cardiovascular:  2+ radial and ulnar pulses. Fingers well-perfused.  Respiratory:        Effort normal. No respiratory distress. Speaking in complete sentences.  Neurologic:       Alert and oriented to person, place, and time.  Skin:                Skin is intact, warm and dry.  Hematologic / Lymphatic:    No lymphedema or lymphangitis.    Extremities / Musculoskeletal:                      Skin of right long finger and hand is intact with no erythema, ecchymosis, or diffuse swelling.  Normal skin drag and coloration.  Full composite finger flexion extension with good intrinsic plus minus posture.  Palpable flexor nodule and A1 tenderness only of right long finger.  Obvious spontaneous triggering.  Symmetric wrist and forearm motion.  Negative Horan.  Negative midcarpal shift.  Sensation intact to light touch in all distributions.  Capillary refill less than 2 seconds.      IMAGING / LABS / EMGs           None pertinent      Medical History[1]    Medication Documentation Review Audit       Reviewed by Tanner Milan MD (Physician) on 08/05/25 at 1345      Medication Order Taking? Sig Documenting Provider Last Dose Status   ammonium lactate (Lac-Hydrin) 12 % lotion [id number] No Apply 1 Application  topically once daily. Pretty Rolon DPM 3/16/2025 Active   cholecalciferol (Vitamin D-3) 5,000 Units tablet [id number] No Take 1 tablet (125 mcg) by mouth once daily. Historical Provider, MD 3/16/2025 Active   desvenlafaxine 50 mg 24 hr tablet [id number] No Take 1 tablet (50 mg) by mouth once daily. Do not crush, chew, or split. Ysabel Ignacio DO 3/16/2025 Active   doxycycline (Vibramycin) 100 mg capsule [id number]  Take 1 capsule (100 mg) by mouth 2 times a day for 7 days. Take with at least 8 ounces (large glass) of water, do not lie down for 30 minutes after Ysabel Ignacio DO  Active   estradiol (Climara) 0.0375 mg/24 hr patch [id number]  Place 1 patch over 7 days on the skin 1 (one) time per week. Ysabel Ignacio DO  Active   Fluad Triv 2024-25,65y up,,PF, 45 mcg (15 mcg x 3)/0.5 mL syringe [id number]   Historical Provider, MD  Active   fluocinolone 0.01 % cream [id number] No Topical, TID, Refill(s) 0, Date: 09/10/2018 15:52:00 EDT Historical Provider, MD 3/16/2025 Active   gabapentin (Neurontin) 100 mg capsule [id number] No Take 1 capsule (100 mg) by mouth once daily at bedtime. Ysabel Ignacio DO 3/16/2025 Active   ipratropium (Atrovent) 42 mcg (0.06 %) nasal spray [id number]  Administer 2 sprays into each nostril once daily. Ysabel Ignacio DO  Active   Patient not taking:  Discontinued 08/04/25 1342   medroxyPROGESTERone (Provera) 2.5 mg tablet [id number]  Take 1 tablet (2.5 mg) by mouth early in the morning.. Ysabel Ignacio DO  Active   sod sulf-pot chloride-mag sulf (Sutab) 1.479-0.188- 0.225 gram tablet tablet [id number] No Take 12 tablets by mouth 2 times a day. Jonathan Vázquez MD 3/16/2025 Active                    RX Allergies[2]    Social History     Socioeconomic History   • Marital status:      Spouse name: Not on file   • Number of children: Not on file   • Years of education: Not on file   • Highest education level: Not on file   Occupational History   • Not on file   Tobacco Use   • Smoking  status: Never   • Smokeless tobacco: Never   Substance and Sexual Activity   • Alcohol use: Not Currently   • Drug use: Not on file   • Sexual activity: Not on file   Other Topics Concern   • Not on file   Social History Narrative   • Not on file     Social Drivers of Health     Financial Resource Strain: Not on file   Food Insecurity: Not on file   Transportation Needs: Not on file   Physical Activity: Not on file   Stress: Not on file   Social Connections: Not on file   Intimate Partner Violence: Not on file   Housing Stability: Not on file       Surgical History[3]      PROCEDURE    Hand / UE Inj/Asp: R long A1 for trigger finger on 7/30/2025 1:47 PM  Indications: therapeutic  Details: 25 G needle, volar approach  Medications: 20 mg triamcinolone acetonide 40 mg/mL; 0.5 mL lidocaine 10 mg/mL (1 %)  Outcome: tolerated well, no immediate complications  Procedure, treatment alternatives, risks and benefits explained, specific risks discussed. Consent was given by the patient.           Electronically Signed      AMY Milan MD      Orthopaedic Hand Surgery      [phone]       [1]  Past Medical History:  Diagnosis Date   • Adhesive capsulitis of left shoulder     Adhesive capsulitis of left shoulder   • Anxiety disorder, unspecified     Anxiety   • Chronic rhinitis 06/18/2015    Rhinitis   • Flushing     Vasomotor flushing   • Other conditions influencing health status     Reflux   • Pain in unspecified hand     Arthralgia of hand   • Pain in unspecified wrist     Arthralgia of forearm   • Paresthesia of skin     Paresthesia of right foot   • Personal history of other diseases of the musculoskeletal system and connective tissue     History of osteopenia   • Personal history of other diseases of the musculoskeletal system and connective tissue 02/04/2016    History of osteoarthritis   • Personal history of other diseases of the nervous system and sense organs     History of carpal tunnel syndrome   •  Personal history of other diseases of the respiratory system 09/23/2014    History of acute bronchitis   • Personal history of other endocrine, nutritional and metabolic disease     History of hyperlipidemia   • Personal history of other specified conditions     History of edema   • Personal history of other specified conditions     History of fibrocystic disease of breast   • Personal history of other specified conditions     History of ulceration   • Personal history of peptic ulcer disease     History of peptic ulcer   • Personal history of peptic ulcer disease     Personal history of gastric ulcer   • Polyarthritis, unspecified 06/15/2020    Polyarthropathy   • Postmenopausal bleeding     Postmenopausal bleeding   • Trigger finger, unspecified finger     Trigger finger, right   [2]  Allergies  Allergen Reactions   • Cephalexin Other   • Penicillins Other   [3]  Past Surgical History:  Procedure Laterality Date   • ADENOIDECTOMY  12/12/2017    Adenoidectomy   • OTHER SURGICAL HISTORY  09/23/2014    Hysteroscopy   • OTHER SURGICAL HISTORY  09/23/2014    Splenotomy   • TONSILLECTOMY  12/12/2017    Tonsillectomy   • VENTRAL HERNIA REPAIR  09/23/2014    Ventral Hernia Repair

## 2025-08-04 ENCOUNTER — OFFICE VISIT (OUTPATIENT)
Dept: PRIMARY CARE | Facility: CLINIC | Age: 80
End: 2025-08-04
Payer: MEDICARE

## 2025-08-04 VITALS
HEART RATE: 93 BPM | SYSTOLIC BLOOD PRESSURE: 110 MMHG | OXYGEN SATURATION: 98 % | DIASTOLIC BLOOD PRESSURE: 60 MMHG | HEIGHT: 64 IN | BODY MASS INDEX: 22.88 KG/M2 | WEIGHT: 134 LBS

## 2025-08-04 DIAGNOSIS — R43.8 BAD TASTE IN MOUTH: ICD-10-CM

## 2025-08-04 DIAGNOSIS — N95.1 MENOPAUSAL SYMPTOM: ICD-10-CM

## 2025-08-04 DIAGNOSIS — H93.8X2 EAR FULLNESS, LEFT: Primary | ICD-10-CM

## 2025-08-04 DIAGNOSIS — R09.82 POST-NASAL DRIP: ICD-10-CM

## 2025-08-04 PROCEDURE — 1159F MED LIST DOCD IN RCRD: CPT | Performed by: STUDENT IN AN ORGANIZED HEALTH CARE EDUCATION/TRAINING PROGRAM

## 2025-08-04 PROCEDURE — 1160F RVW MEDS BY RX/DR IN RCRD: CPT | Performed by: STUDENT IN AN ORGANIZED HEALTH CARE EDUCATION/TRAINING PROGRAM

## 2025-08-04 PROCEDURE — 1036F TOBACCO NON-USER: CPT | Performed by: STUDENT IN AN ORGANIZED HEALTH CARE EDUCATION/TRAINING PROGRAM

## 2025-08-04 PROCEDURE — 99213 OFFICE O/P EST LOW 20 MIN: CPT | Performed by: STUDENT IN AN ORGANIZED HEALTH CARE EDUCATION/TRAINING PROGRAM

## 2025-08-04 PROCEDURE — G2211 COMPLEX E/M VISIT ADD ON: HCPCS | Performed by: STUDENT IN AN ORGANIZED HEALTH CARE EDUCATION/TRAINING PROGRAM

## 2025-08-04 RX ORDER — MEDROXYPROGESTERONE ACETATE 2.5 MG/1
2.5 TABLET ORAL
Qty: 30 TABLET | Refills: 3 | Status: SHIPPED | OUTPATIENT
Start: 2025-08-04

## 2025-08-04 RX ORDER — ESTRADIOL 0.04 MG/D
1 PATCH TRANSDERMAL WEEKLY
Qty: 4 PATCH | Refills: 11 | Status: SHIPPED | OUTPATIENT
Start: 2025-08-04 | End: 2026-08-04

## 2025-08-04 RX ORDER — DOXYCYCLINE 100 MG/1
100 CAPSULE ORAL 2 TIMES DAILY
Qty: 14 CAPSULE | Refills: 0 | Status: SHIPPED | OUTPATIENT
Start: 2025-08-04 | End: 2025-08-11

## 2025-08-04 NOTE — PROGRESS NOTES
Subjective   Patient ID: Maria Luisa Tirado is a 79 y.o. female who presents for Ear Fullness (Left ear fullness/ pressure and feels like a tickling for the last few weeks. /She also reports that she has had a really bad taste in her mouth and it doesn't go away when she brushes her teeth ).  History of Present Illness  Maria Luisa Tirado is a 79 year old female who presents with persistent ear pain and pressure.    She has been experiencing persistent ear pain and pressure for the past few weeks, primarily affecting her left ear. The pain is described as throbbing, with pressure and occasional sharp sensations. Despite using allergy medications and nasal sprays, there has been no significant improvement. She experiences nasal drainage and occasional itching inside her ear, which once disrupted her sleep. Application of cortisone has helped alleviate the itching. She also notes a bad taste in her mouth, she is not sure if this is from dry mouth, possibly related to her allergy medications.    She discusses her osteoarthritis of the hands, which has worsened since discontinuing hormone replacement therapy. 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 She was previously on a low dose of hormone therapy, specifically medroxyprogesterone and climera patch, and is interested in increasing the dose to better manage her symptoms. She has been experiencing hot flashes post-menopause and has tried various treatments without significant improvement.    In her social history, she is currently taking care of her bedridden , who is alert and able to call for assistance if needed. She does not smoke or drink and generally maintains a healthy diet. She is concerned about the impact of her hand condition on her ability to care for her .    Review of Systems  ROS otherwise negative aside from what was mentioned above in HPI.    Objective     /60 (BP Location: Left arm, Patient Position: Sitting, BP  "Cuff Size: Adult)   Pulse 93   Ht 1.626 m (5' 4\")   Wt 60.8 kg (134 lb)   SpO2 98%   BMI 23.00 kg/m²      Current Outpatient Medications   Medication Instructions    ammonium lactate (Lac-Hydrin) 12 % lotion 1 Application, Topical, Daily    cholecalciferol (Vitamin D-3) 5,000 Units tablet 1 tablet, Daily    desvenlafaxine (PRISTIQ) 50 mg, oral, Daily, Do not crush, chew, or split.    Fluad Triv 2024-25,65y up,,PF, 45 mcg (15 mcg x 3)/0.5 mL syringe     fluocinolone 0.01 % cream Topical, TID, Refill(s) 0, Date: 09/10/2018 15:52:00 EDT    gabapentin (NEURONTIN) 100 mg, oral, Nightly    ipratropium (Atrovent) 42 mcg (0.06 %) nasal spray 2 sprays, Each Nostril, Daily    medroxyPROGESTERone (Provera) 2.5 mg tablet 1 tablet, Daily (0630)    sod sulf-pot chloride-mag sulf (Sutab) 1.479-0.188- 0.225 gram tablet tablet 12 tablets, oral, 2 times daily       Physical Exam  GENERAL: Alert, cooperative, well developed, no acute distress.  HEENT: Normocephalic, normal oropharynx, moist mucous membranes, left eardrum bulging.  SKIN: No rashes or lesions.  NEURO: Cranial nerves grossly intact, moves all extremities without gross motor impairment, normal ROM.    Results      Assessment & Plan  Left ear pain with eustachian tube dysfunction  Persistent left ear pain for a few weeks with symptoms of throbbing, pressure, and occasional sharp pain. Examination reveals a bulging eardrum indicating pressure, but no significant nasal swelling or infection. Differential includes eustachian tube dysfunction.  - Prescribe doxycycline for one week to address potential infection.    Osteoarthritis of bilateral hands  Worsening osteoarthritis symptoms in the hands, with a frozen joint and increased pain since discontinuation of hormone replacement therapy. Previous injection by Dr. Milan provided some relief, but full effect may take 2-3 weeks. She expresses interest in resuming hormone therapy to slow progression and improve hand " mobility.  - Increase medroxyprogesterone (Provera) to 5 mg daily.  - Resume hormone replacement therapy with a higher dose patch (0.0375 mg) to manage osteoarthritis symptoms and improve hand function.    Postmenopausal symptoms (hot flashes) and hormone replacement therapy management  Persistent postmenopausal symptoms, including hot flashes, despite previous hormone replacement therapy. She has been on a low-dose patch and medroxyprogesterone, but symptoms persist. Discussion on the safety and benefits of hormone therapy, considering her low-risk profile for adverse effects. She is informed that her low-risk profile includes no smoking, no personal history of blood clots, and no breast cancer, which supports the safety of continuing hormone therapy. Studies indicate no significant difference in risk between the general public and those on hormone therapy.  - Monitor response to increased hormone therapy over several months and adjust as needed.    Follow up in 6 months or sooner as needed    Ysabel Ignacio, DO     This medical note was created with the assistance of artificial intelligence (AI) for documentation purposes. The content has been reviewed and confirmed by the healthcare provider for accuracy and completeness. Patient consented to the use of audio recording and use of AI during their visit.

## 2025-08-05 RX ORDER — TRIAMCINOLONE ACETONIDE 40 MG/ML
20 INJECTION, SUSPENSION INTRA-ARTICULAR; INTRAMUSCULAR
Status: COMPLETED | OUTPATIENT
Start: 2025-07-30 | End: 2025-07-30

## 2025-08-05 RX ORDER — LIDOCAINE HYDROCHLORIDE 10 MG/ML
0.5 INJECTION, SOLUTION INFILTRATION; PERINEURAL
Status: COMPLETED | OUTPATIENT
Start: 2025-07-30 | End: 2025-07-30

## 2025-08-05 NOTE — PROGRESS NOTES
CHIEF COMPLAINT         Trigger finger    ASSESSMENT + PLAN    Right long trigger finger    The nature of trigger finger was reviewed, along with the natural history.  I reviewed the options for management, including observation, nonsteroidals, splinting, oral steroids, cortisone injection, or surgical release, along with the likely success rates of each.  I reviewed the risks of cortisone injection, including infection, hypopigmentation, and fat atrophy.  I cautioned the patient to avoid hot objects where the finger could be burned, if it becomes insensate temporarily from the lidocaine. The transient cortisone effect on blood glucose measurement was also reviewed, and the patient wished to proceed.    After sterile prep, I injected 20 mg of Kenalog and 0.5 cc of 1% lidocaine, plain to the flexor sheath of the right long finger.    Patient will followup in 4 weeks if still symptomatic, or with any concerns.        HISTORY OF PRESENT ILLNESS       Patient is a 79 y.o. right-hand dominant female retiree, who presents today at request of Dr. Ignacio for evaluation of a right long trigger finger.  This has been bothersome for several months.  No single specific recalled trauma around time of onset.  No numbness or tingling.  The episodes are painful and are more frequent in the morning.  She has required use of the opposite hand for extension.  No other bothersome digits at present.  No particular intervention so far.    She is not diabetic or hypothyroid.  She does not smoke.      REVIEW OF SYSTEMS       A 30-item multi-system Review Of Systems was obtained on today's intake form.  This was reviewed with the patient and is correct.  The pertinent positives and negatives are listed above.  The form has been scanned separately into the medical record.      PHYSICAL EXAM    Constitutional:    Appears stated age. Well-developed and well-nourished female in no acute distress.  Psychiatric:         Pleasant normal mood and  affect. Behavior is appropriate for the situation.   Head:                   Normocephalic and atraumatic.  Eyes:                    Pupils are equal and round.  Cardiovascular:  2+ radial and ulnar pulses. Fingers well-perfused.  Respiratory:        Effort normal. No respiratory distress. Speaking in complete sentences.  Neurologic:       Alert and oriented to person, place, and time.  Skin:                Skin is intact, warm and dry.  Hematologic / Lymphatic:    No lymphedema or lymphangitis.    Extremities / Musculoskeletal:                      Skin of right long finger and hand is intact with no erythema, ecchymosis, or diffuse swelling.  Normal skin drag and coloration.  Full composite finger flexion extension with good intrinsic plus minus posture.  Palpable flexor nodule and A1 tenderness only of right long finger.  Obvious spontaneous triggering.  Symmetric wrist and forearm motion.  Negative Horan.  Negative midcarpal shift.  Sensation intact to light touch in all distributions.  Capillary refill less than 2 seconds.      IMAGING / LABS / EMGs           None pertinent      Medical History[1]    Medication Documentation Review Audit       Reviewed by Tanner Milan MD (Physician) on 08/05/25 at 1345      Medication Order Taking? Sig Documenting Provider Last Dose Status   ammonium lactate (Lac-Hydrin) 12 % lotion [id number] No Apply 1 Application topically once daily. Pretty Rolon DPM 3/16/2025 Active   cholecalciferol (Vitamin D-3) 5,000 Units tablet [id number] No Take 1 tablet (125 mcg) by mouth once daily. Historical Provider, MD 3/16/2025 Active   desvenlafaxine 50 mg 24 hr tablet [id number] No Take 1 tablet (50 mg) by mouth once daily. Do not crush, chew, or split. Ysabel Ignacio, DO 3/16/2025 Active   doxycycline (Vibramycin) 100 mg capsule [id number]  Take 1 capsule (100 mg) by mouth 2 times a day for 7 days. Take with at least 8 ounces (large glass) of water, do not lie down for 30 minutes  after Ysabel Igancio DO  Active   estradiol (Climara) 0.0375 mg/24 hr patch [id number]  Place 1 patch over 7 days on the skin 1 (one) time per week. Ysabel Ignacio DO  Active   Fluad Triv 2024-25,65y up,,PF, 45 mcg (15 mcg x 3)/0.5 mL syringe [id number]   Historical Provider, MD  Active   fluocinolone 0.01 % cream [id number] No Topical, TID, Refill(s) 0, Date: 09/10/2018 15:52:00 EDT Historical Provider, MD 3/16/2025 Active   gabapentin (Neurontin) 100 mg capsule [id number] No Take 1 capsule (100 mg) by mouth once daily at bedtime. Ysabel Ignacio DO 3/16/2025 Active   ipratropium (Atrovent) 42 mcg (0.06 %) nasal spray [id number]  Administer 2 sprays into each nostril once daily. Ysabel Ignacio DO  Active   Patient not taking:  Discontinued 08/04/25 1342   medroxyPROGESTERone (Provera) 2.5 mg tablet [id number]  Take 1 tablet (2.5 mg) by mouth early in the morning.. Ysabel Ignacio DO  Active   sod sulf-pot chloride-mag sulf (Sutab) 1.479-0.188- 0.225 gram tablet tablet [id number] No Take 12 tablets by mouth 2 times a day. Jonathan Vázquez MD 3/16/2025 Active                    RX Allergies[2]    Social History     Socioeconomic History    Marital status:      Spouse name: Not on file    Number of children: Not on file    Years of education: Not on file    Highest education level: Not on file   Occupational History    Not on file   Tobacco Use    Smoking status: Never    Smokeless tobacco: Never   Substance and Sexual Activity    Alcohol use: Not Currently    Drug use: Not on file    Sexual activity: Not on file   Other Topics Concern    Not on file   Social History Narrative    Not on file     Social Drivers of Health     Financial Resource Strain: Not on file   Food Insecurity: Not on file   Transportation Needs: Not on file   Physical Activity: Not on file   Stress: Not on file   Social Connections: Not on file   Intimate Partner Violence: Not on file   Housing Stability: Not on file       Surgical  History[3]      PROCEDURE    Hand / UE Inj/Asp: R long A1 for trigger finger on 7/30/2025 1:47 PM  Indications: therapeutic  Details: 25 G needle, volar approach  Medications: 20 mg triamcinolone acetonide 40 mg/mL; 0.5 mL lidocaine 10 mg/mL (1 %)  Outcome: tolerated well, no immediate complications  Procedure, treatment alternatives, risks and benefits explained, specific risks discussed. Consent was given by the patient.           Electronically Signed      AMY Milan MD      Orthopaedic Hand Surgery      [phone]       [1]   Past Medical History:  Diagnosis Date    Adhesive capsulitis of left shoulder     Adhesive capsulitis of left shoulder    Anxiety disorder, unspecified     Anxiety    Chronic rhinitis 06/18/2015    Rhinitis    Flushing     Vasomotor flushing    Other conditions influencing health status     Reflux    Pain in unspecified hand     Arthralgia of hand    Pain in unspecified wrist     Arthralgia of forearm    Paresthesia of skin     Paresthesia of right foot    Personal history of other diseases of the musculoskeletal system and connective tissue     History of osteopenia    Personal history of other diseases of the musculoskeletal system and connective tissue 02/04/2016    History of osteoarthritis    Personal history of other diseases of the nervous system and sense organs     History of carpal tunnel syndrome    Personal history of other diseases of the respiratory system 09/23/2014    History of acute bronchitis    Personal history of other endocrine, nutritional and metabolic disease     History of hyperlipidemia    Personal history of other specified conditions     History of edema    Personal history of other specified conditions     History of fibrocystic disease of breast    Personal history of other specified conditions     History of ulceration    Personal history of peptic ulcer disease     History of peptic ulcer    Personal history of peptic ulcer disease     Personal  history of gastric ulcer    Polyarthritis, unspecified 06/15/2020    Polyarthropathy    Postmenopausal bleeding     Postmenopausal bleeding    Trigger finger, unspecified finger     Trigger finger, right   [2]   Allergies  Allergen Reactions    Cephalexin Other    Penicillins Other   [3]   Past Surgical History:  Procedure Laterality Date    ADENOIDECTOMY  12/12/2017    Adenoidectomy    OTHER SURGICAL HISTORY  09/23/2014    Hysteroscopy    OTHER SURGICAL HISTORY  09/23/2014    Splenotomy    TONSILLECTOMY  12/12/2017    Tonsillectomy    VENTRAL HERNIA REPAIR  09/23/2014    Ventral Hernia Repair

## 2026-01-20 ENCOUNTER — APPOINTMENT (OUTPATIENT)
Dept: PRIMARY CARE | Facility: CLINIC | Age: 81
End: 2026-01-20
Payer: MEDICARE